# Patient Record
Sex: FEMALE | Race: WHITE | NOT HISPANIC OR LATINO | Employment: OTHER | URBAN - NONMETROPOLITAN AREA
[De-identification: names, ages, dates, MRNs, and addresses within clinical notes are randomized per-mention and may not be internally consistent; named-entity substitution may affect disease eponyms.]

---

## 2017-03-23 ENCOUNTER — OFFICE VISIT (OUTPATIENT)
Dept: ORTHOPEDIC SURGERY | Facility: CLINIC | Age: 62
End: 2017-03-23

## 2017-03-23 DIAGNOSIS — M18.0 PRIMARY OSTEOARTHRITIS OF BOTH FIRST CARPOMETACARPAL JOINTS: Primary | ICD-10-CM

## 2017-03-23 PROCEDURE — 99213 OFFICE O/P EST LOW 20 MIN: CPT | Performed by: ORTHOPAEDIC SURGERY

## 2017-03-23 PROCEDURE — 20600 DRAIN/INJ JOINT/BURSA W/O US: CPT | Performed by: ORTHOPAEDIC SURGERY

## 2017-03-23 RX ADMIN — LIDOCAINE HYDROCHLORIDE 2 ML: 10 INJECTION, SOLUTION INFILTRATION; PERINEURAL at 13:50

## 2017-03-23 RX ADMIN — METHYLPREDNISOLONE ACETATE 160 MG: 80 INJECTION, SUSPENSION INTRA-ARTICULAR; INTRALESIONAL; INTRAMUSCULAR; SOFT TISSUE at 13:50

## 2017-04-05 RX ORDER — LIDOCAINE HYDROCHLORIDE 10 MG/ML
2 INJECTION, SOLUTION INFILTRATION; PERINEURAL
Status: COMPLETED | OUTPATIENT
Start: 2017-03-23 | End: 2017-03-23

## 2017-04-05 RX ORDER — METHYLPREDNISOLONE ACETATE 80 MG/ML
160 INJECTION, SUSPENSION INTRA-ARTICULAR; INTRALESIONAL; INTRAMUSCULAR; SOFT TISSUE
Status: COMPLETED | OUTPATIENT
Start: 2017-03-23 | End: 2017-03-23

## 2017-08-14 ENCOUNTER — CLINICAL SUPPORT (OUTPATIENT)
Dept: ORTHOPEDIC SURGERY | Facility: CLINIC | Age: 62
End: 2017-08-14

## 2017-08-14 DIAGNOSIS — M18.0 PRIMARY OSTEOARTHRITIS OF BOTH FIRST CARPOMETACARPAL JOINTS: Primary | ICD-10-CM

## 2017-08-14 PROCEDURE — 20600 DRAIN/INJ JOINT/BURSA W/O US: CPT | Performed by: ORTHOPAEDIC SURGERY

## 2017-08-14 PROCEDURE — 99213 OFFICE O/P EST LOW 20 MIN: CPT | Performed by: ORTHOPAEDIC SURGERY

## 2017-08-14 RX ADMIN — METHYLPREDNISOLONE ACETATE 160 MG: 80 INJECTION, SUSPENSION INTRA-ARTICULAR; INTRALESIONAL; INTRAMUSCULAR; SOFT TISSUE at 10:35

## 2017-08-14 RX ADMIN — LIDOCAINE HYDROCHLORIDE 2 ML: 10 INJECTION, SOLUTION INFILTRATION; PERINEURAL at 10:35

## 2017-08-14 NOTE — PROGRESS NOTES
Pearl Temple  ?  1955  ?  Chief Complaint   Patient presents with   • Left Thumb - Follow-up     ?  HPI the patient is here today for a follow up of her left thumb pain and discomfort.She has a lot of pain and discomfort when she tries to make a fist.  The pain is at the base of the first metacarpal.  She has weakness of pinch and  strength because of the pain and discomfort.  She has developed progressive subluxation of the first metacarpal base on the CMC joint.  She has thought about surgical intervention because of progressive symptoms and impairment of her quality of life.     Physical Exam   Constitutional: She is oriented to person, place, and time. She appears well-nourished.   HENT:   Head: Atraumatic.   Eyes: EOM are normal.   Neck: Neck supple.   Cardiovascular: Intact distal pulses.    Pulmonary/Chest: Effort normal and breath sounds normal.   Abdominal: Bowel sounds are normal.   Musculoskeletal: She exhibits edema and tenderness.   Neurological: She is alert and oriented to person, place, and time. She has normal reflexes.   Skin: Skin is dry.   Psychiatric: She has a normal mood and affect. Her behavior is normal. Judgment and thought content normal.   Nursing note and vitals reviewed.      Review of Systems   Constitutional: Negative.    HENT: Negative.    Eyes: Negative.    Respiratory: Negative.    Cardiovascular: Negative.    Gastrointestinal: Negative.    Endocrine: Negative.    Genitourinary: Negative.    Musculoskeletal: Negative.    Skin: Negative.    Allergic/Immunologic: Negative.    Neurological: Negative.    Hematological: Negative.    Psychiatric/Behavioral: Negative.        Joint/Body Part Specific Exam:   left hand. Tenderness is present at base of the 1st metacarpal. Skin and soft tissues are mildly swollen. Flexor and extensor tendon function is well preserved. Capillary refill is 2 seconds with a brisk return. Axial lading of the joint causes crepitus and pain. Pinch  strength is compromised. Slight subluxation of the 1st metacarpal base is noted. Neurovascular status is intact.    left wrist. The patient is right  hand dominant. There is a significant amount of swelling and tenderness along the 1st dorsal compartment tendons. Abduction of the thumb bothers the patient significantly. There is pain and tenderness over the radial styloid process. Skin and soft tissues are somewhat swollen and mildly bruised. Finkelstein sign is positive. Ulnar deviation of the wrist bothers the patient significantly. Patients  strength is somewhat compromised because of the pain along the base of the thumb and over the radial styloid process. Patient is unable to make a fist with good  strength when fist is clenched. Capillary refill is 2 seconds with a brisk return. Radial artery pulses are palpable. Median nerve function is well preserved. There is some amount of inflammation over the dorsal extensor tendon sheaths over the remaining radial carpal compartments dorsally.  X-Ray Report:    Diagnostics:    Small Joint Arthrocentesis  Consent given by: patient  Site marked: site marked  Timeout: Immediately prior to procedure a time out was called to verify the correct patient, procedure, equipment, support staff and site/side marked as required   Supporting Documentation  Indications: pain   Procedure Details  Location: thumb - L thumb CMC  Preparation: Patient was prepped and draped in the usual sterile fashion  Needle size: 27 G  Medications administered: 2 mL lidocaine 1 %; 160 mg methylPREDNISolone acetate 80 MG/ML  Patient tolerance: patient tolerated the procedure well with no immediate complications        ?  ?  Plan      · Ice pack for 48 hrs     · Injected patients left thumb CMC joint with Steroid    · Ace wrap for swelling PRN    · Elevation for swelling PRN    · Tablet Ibuprofen 600 mg P.O. BID x 5 Days with meals    · Call office for any problems  With procedure    · Home  Physical Therapy Program discussed with patient    · F/U in 3 months for Re-evaluation.  ·   · Patient has a lot of pain and discomfort with using anti-inflammatory medication and therefore I have prescribed her a prescription of Pennsaid for topical analgesic application as an anti-inflammatory for local comfort.  ·   · Also discussed with the patient about using a supportive thumb spica style brace to protect the function of the thumb.

## 2017-08-24 RX ORDER — METHYLPREDNISOLONE ACETATE 80 MG/ML
160 INJECTION, SUSPENSION INTRA-ARTICULAR; INTRALESIONAL; INTRAMUSCULAR; SOFT TISSUE
Status: COMPLETED | OUTPATIENT
Start: 2017-08-14 | End: 2017-08-14

## 2017-08-24 RX ORDER — LIDOCAINE HYDROCHLORIDE 10 MG/ML
2 INJECTION, SOLUTION INFILTRATION; PERINEURAL
Status: COMPLETED | OUTPATIENT
Start: 2017-08-14 | End: 2017-08-14

## 2017-10-10 ENCOUNTER — TELEPHONE (OUTPATIENT)
Dept: ORTHOPEDIC SURGERY | Facility: CLINIC | Age: 62
End: 2017-10-10

## 2017-10-10 NOTE — TELEPHONE ENCOUNTER
PATIENT CALLED AND STATED THAT THE [employer] WAS REALLY HELPING HER, HOWEVER, IT IS NOT COVERED UNDER HER INSURANCE.  IS THERE ANYTHING ELSE DR. BLANCO CAN PRESCRIBE?

## 2017-11-21 ENCOUNTER — CLINICAL SUPPORT (OUTPATIENT)
Dept: ORTHOPEDIC SURGERY | Facility: CLINIC | Age: 62
End: 2017-11-21

## 2017-11-21 DIAGNOSIS — M18.0 PRIMARY OSTEOARTHRITIS OF BOTH FIRST CARPOMETACARPAL JOINTS: Primary | ICD-10-CM

## 2017-11-21 PROCEDURE — 20600 DRAIN/INJ JOINT/BURSA W/O US: CPT | Performed by: ORTHOPAEDIC SURGERY

## 2017-11-21 PROCEDURE — 99213 OFFICE O/P EST LOW 20 MIN: CPT | Performed by: ORTHOPAEDIC SURGERY

## 2017-11-21 RX ADMIN — METHYLPREDNISOLONE ACETATE 160 MG: 80 INJECTION, SUSPENSION INTRA-ARTICULAR; INTRALESIONAL; INTRAMUSCULAR; SOFT TISSUE at 13:43

## 2017-11-21 RX ADMIN — LIDOCAINE HYDROCHLORIDE 2 ML: 10 INJECTION, SOLUTION INFILTRATION; PERINEURAL at 13:43

## 2017-11-21 NOTE — PROGRESS NOTES
Pearl Temple  ?  1955  ?  Chief Complaint   Patient presents with   • Left Thumb - Follow-up     ?  HPI the patient is here today for left thumb pain And discomfort.  She has increasing pain and discomfort over the base of the thumb over the CMC joint.  Axial loading of the joint bothers the patient significantly.  She is unable to grasp heavy objects because her  strength is associated with pain and weakness.  She has a sense of crepitus throughout the range of motion especially when she is making a fist.  Surgical options at been discussed with the patient extensively.  She is not quite ready to undergo a CMC joint arthroplasty just yet.  Her right middle finger is bothering her significantly as well.  There is a tender nodule on the volar aspect of the base of the MCP joint of the middle finger.  Symptoms of early triggering have started and she has difficulty when she makes a full fist.  Full extension of the digit can sometimes not be accomplished spontaneously and the finger has to be pulled out into full extension with direct pressure.    Physical Exam   Constitutional: She is oriented to person, place, and time. She appears well-nourished.   HENT:   Head: Atraumatic.   Eyes: EOM are normal.   Neck: Neck supple.   Cardiovascular: Normal rate and intact distal pulses.    Pulmonary/Chest: Effort normal and breath sounds normal.   Abdominal: Bowel sounds are normal.   Musculoskeletal: She exhibits edema and tenderness.   Neurological: She is alert and oriented to person, place, and time. She has normal reflexes.   Skin: Skin is dry.   Psychiatric: She has a normal mood and affect. Her behavior is normal. Judgment and thought content normal.   Nursing note and vitals reviewed.      Review of Systems   Constitutional: Negative.    HENT: Negative.    Eyes: Negative.    Respiratory: Negative.    Cardiovascular: Negative.    Gastrointestinal: Negative.    Endocrine: Negative.    Genitourinary: Negative.     Musculoskeletal: Positive for joint swelling.   Skin: Negative.    Allergic/Immunologic: Negative.    Neurological: Negative.    Hematological: Negative.    Psychiatric/Behavioral: Negative.        Joint/Body Part Specific Exam:   left hand. Tenderness is present at base of the 1st metacarpal. Skin and soft tissues are mildly swollen. Flexor and extensor tendon function is well preserved. Capillary refill is 2 seconds with a brisk return. Axial lading of the joint causes crepitus and pain. Pinch strength is compromised. Slight subluxation of the 1st metacarpal base is noted. Neurovascular status is intact.    right wrist. Trigger right middle finger. Skin is swollen over volar aspect of MCP joint. Flexor and extensor tendon functions are both well maintained. Tender nodule over the volar aspect of MCP joint. Capillary refill is two seconds with a brisk return. Triggering is passively correctable. Median nerve function is normal. Radial artery pulse is palpable.  X-Ray Report:    Diagnostics:    Small Joint Arthrocentesis  Consent given by: patient  Site marked: site marked  Timeout: Immediately prior to procedure a time out was called to verify the correct patient, procedure, equipment, support staff and site/side marked as required   Supporting Documentation  Indications: pain   Procedure Details  Location: thumb - L thumb CMC  Preparation: Patient was prepped and draped in the usual sterile fashion  Needle size: 27 G  Medications administered: 2 mL lidocaine 1 %; 160 mg methylPREDNISolone acetate 80 MG/ML  Patient tolerance: patient tolerated the procedure well with no immediate complications        ?  ?  Plan      · Ice pack for 48 hrs     · Injected patients CMC joint of the left with Steroid    · Ace wrap for swelling PRN    · Elevation for swelling PRN    · Tablet Ibuprofen 600 mg P.O. BID x 5 Days with meals    · Call office for any problems  With procedure    · Home Physical Therapy Program discussed with  patient    · F/U in 3 months for Re-evaluation.  ·   · Discussed with the patient about the pathology of the locking trigger digit and eventually the fact that she is most likely going to need a surgical release of this A1 pulley to minimize her symptoms.  She will let me know when her symptoms progress and she wants to proceed with surgical resection of the A1 pulley and release of the flexor tendons of the finger.

## 2017-11-25 RX ORDER — LIDOCAINE HYDROCHLORIDE 10 MG/ML
2 INJECTION, SOLUTION INFILTRATION; PERINEURAL
Status: COMPLETED | OUTPATIENT
Start: 2017-11-21 | End: 2017-11-21

## 2017-11-25 RX ORDER — METHYLPREDNISOLONE ACETATE 80 MG/ML
160 INJECTION, SUSPENSION INTRA-ARTICULAR; INTRALESIONAL; INTRAMUSCULAR; SOFT TISSUE
Status: COMPLETED | OUTPATIENT
Start: 2017-11-21 | End: 2017-11-21

## 2018-02-20 ENCOUNTER — OFFICE VISIT (OUTPATIENT)
Dept: ORTHOPEDIC SURGERY | Facility: CLINIC | Age: 63
End: 2018-02-20

## 2018-02-20 DIAGNOSIS — M18.12 ARTHRITIS OF CARPOMETACARPAL (CMC) JOINT OF LEFT THUMB: ICD-10-CM

## 2018-02-20 DIAGNOSIS — G89.29 CHRONIC LEFT SHOULDER PAIN: Primary | ICD-10-CM

## 2018-02-20 DIAGNOSIS — M25.512 CHRONIC LEFT SHOULDER PAIN: Primary | ICD-10-CM

## 2018-02-20 PROCEDURE — 99213 OFFICE O/P EST LOW 20 MIN: CPT | Performed by: ORTHOPAEDIC SURGERY

## 2018-02-20 PROCEDURE — 73030 X-RAY EXAM OF SHOULDER: CPT | Performed by: ORTHOPAEDIC SURGERY

## 2018-02-20 PROCEDURE — 20600 DRAIN/INJ JOINT/BURSA W/O US: CPT | Performed by: ORTHOPAEDIC SURGERY

## 2018-02-20 RX ORDER — DAPAGLIFLOZIN 5 MG/1
TABLET, FILM COATED ORAL
COMMUNITY
Start: 2018-02-03

## 2018-02-20 RX ORDER — FLUCONAZOLE 150 MG/1
TABLET ORAL
COMMUNITY
Start: 2017-12-18

## 2018-02-20 RX ORDER — DOXYCYCLINE 100 MG/1
CAPSULE ORAL
COMMUNITY
Start: 2017-12-18 | End: 2020-08-13

## 2018-02-20 RX ORDER — ESOMEPRAZOLE MAGNESIUM 40 MG/1
CAPSULE, DELAYED RELEASE ORAL
COMMUNITY
Start: 2018-02-10

## 2018-02-20 RX ORDER — EMPAGLIFLOZIN 10 MG/1
TABLET, FILM COATED ORAL
COMMUNITY
Start: 2017-11-13 | End: 2019-09-05

## 2018-02-20 RX ADMIN — METHYLPREDNISOLONE ACETATE 160 MG: 80 INJECTION, SUSPENSION INTRA-ARTICULAR; INTRALESIONAL; INTRAMUSCULAR; SOFT TISSUE at 15:20

## 2018-02-20 RX ADMIN — LIDOCAINE HYDROCHLORIDE 2 ML: 10 INJECTION, SOLUTION EPIDURAL; INFILTRATION; INTRACAUDAL; PERINEURAL at 15:20

## 2018-03-08 PROBLEM — M18.12 ARTHRITIS OF CARPOMETACARPAL (CMC) JOINT OF LEFT THUMB: Status: ACTIVE | Noted: 2018-03-08

## 2018-03-08 RX ORDER — METHYLPREDNISOLONE ACETATE 80 MG/ML
160 INJECTION, SUSPENSION INTRA-ARTICULAR; INTRALESIONAL; INTRAMUSCULAR; SOFT TISSUE
Status: COMPLETED | OUTPATIENT
Start: 2018-02-20 | End: 2018-02-20

## 2018-03-08 RX ORDER — LIDOCAINE HYDROCHLORIDE 10 MG/ML
2 INJECTION, SOLUTION EPIDURAL; INFILTRATION; INTRACAUDAL; PERINEURAL
Status: COMPLETED | OUTPATIENT
Start: 2018-02-20 | End: 2018-02-20

## 2018-03-16 ENCOUNTER — TELEPHONE (OUTPATIENT)
Dept: ORTHOPEDIC SURGERY | Facility: CLINIC | Age: 63
End: 2018-03-16

## 2018-03-16 NOTE — TELEPHONE ENCOUNTER
I called and advised patient her MRI was denied and that she will need to have at least 6 weeks of physical therapy before we can try to reorder it. Patient understands that I will fax [facility] and they will call to schedule her. Ms. Temple will call back after she's completed 6 weeks of therapy.

## 2018-06-11 ENCOUNTER — TELEPHONE (OUTPATIENT)
Dept: ORTHOPEDIC SURGERY | Facility: CLINIC | Age: 63
End: 2018-06-11

## 2018-06-11 NOTE — TELEPHONE ENCOUNTER
Patient called stating her therapy has been completed and she would like to try to have the MRI scheduled for her left shoulder.

## 2018-07-05 ENCOUNTER — OFFICE VISIT (OUTPATIENT)
Dept: ORTHOPEDIC SURGERY | Facility: CLINIC | Age: 63
End: 2018-07-05

## 2018-07-05 DIAGNOSIS — M25.512 CHRONIC LEFT SHOULDER PAIN: Primary | ICD-10-CM

## 2018-07-05 DIAGNOSIS — G89.29 CHRONIC LEFT SHOULDER PAIN: Primary | ICD-10-CM

## 2018-07-05 PROCEDURE — 99214 OFFICE O/P EST MOD 30 MIN: CPT | Performed by: ORTHOPAEDIC SURGERY

## 2018-07-05 NOTE — PROGRESS NOTES
Chief Complaint   Patient presents with   • Left Shoulder - Results           HPI the patient is here today for MRI results of her left shoulder. She continues to have significant amounts of pain and discomfort over the left shoulder.  She has distinct weakness in abduction.  She states that she is absolutely miserable at night because of the pain and discomfort.  She cannot reach up into the overhead abducted position.  Any form of external rotation bothers the patient significantly as well.  The patient states that she would like to proceed with some form of intervention ASAP because her quality of life is very negative because of the limited range of motion and the severe pain she is experiencing from her adhesive capsulitis.        There were no vitals filed for this visit.        Review of Systems   Constitutional: Negative.    HENT: Negative.    Eyes: Negative.    Respiratory: Negative.    Cardiovascular: Negative.    Gastrointestinal: Negative.    Endocrine: Negative.    Genitourinary: Negative.    Musculoskeletal: Positive for joint swelling.   Skin: Negative.    Allergic/Immunologic: Negative.    Neurological: Negative.    Hematological: Negative.    Psychiatric/Behavioral: Negative.            Physical Exam   Constitutional: She is oriented to person, place, and time. She appears well-nourished.   HENT:   Head: Atraumatic.   Eyes: EOM are normal.   Neck: Neck supple.   Cardiovascular: Normal rate, regular rhythm, normal heart sounds and intact distal pulses.    Pulmonary/Chest: Breath sounds normal.   Abdominal: Soft. Bowel sounds are normal.   Musculoskeletal: She exhibits edema and tenderness.   Neurological: She is alert and oriented to person, place, and time.   Skin: Skin is dry. Capillary refill takes 2 to 3 seconds.   Psychiatric: Her behavior is normal. Thought content normal.   Nursing note and vitals reviewed.          Joint/Body Part Specific Exam:  left Shoulder. The shoulder range of motion is  extremely limited both in forward flexion, abduction and external rotation. Rotator cuff function cannot be determined because of lack of motion. The scapula is moving with the humerus upon attempted abduction. The range of motion is abduction 0-30 degrees, forward fexion 0-60° degrees, external rotation 0-10° degrees.  Skin and soft tissues are somewhat swollen and tender. There is anterior joint line tenderness. There is some winging of the scapula. Axillary nerve function is well preserved. Radial artery pulses are palpable. The motion is extremely limited when compared to the contralateral extremity. The pain level is 6 .      X-RAY Report:        Diagnostics:  MRI reports are available.  The images are discussed with the patient.  There is thickening of the anterior capsule of the shoulder joint.  There is no evidence of tear of the rotator cuff.  There are some partial thickness changes in the tendon but no full-thickness tear is demonstrated.  No SLAP tear is noted on the films.      Pearl was seen today for results.    Diagnoses and all orders for this visit:    Chronic left shoulder pain            Procedures        Plan:  The patient has developed adhesive capsulitis and a frozen shoulder.    Tablet ibuprofen 600 mg orally twice a day for pain swelling and discomfort.    Home-based aggressive exercise program including stretching and strengthening with an overhead pulley.    The patient wants to proceed with manipulation of the shoulder and anesthesia ASAP.  We will go ahead and schedule this surgical intervention for the patient this coming Monday    Risks and benefits of surgical intervention as well as the rationale for surgery discussed with the patient.  She does understand that this would be a staged manipulation and if she does not accomplished full range of motion and relief of pain with manipulation and aggressive physical therapy to follow, then we will proceed with arthroscopic lysis of adhesions  and capsular release in a surgical fashion.    All risks and benefits discussed with the patient to the extent of 30 minutes.  Greater than 50% of my time was spent making the surgical decision with the patient going over the MRI reports and taking a plan of action going forward.    Possibility of continued pain, limited range of motion, iatrogenic proximal humerus fracture, reflex symptomatic dystrophy and other general surgical complications discussed with the patient at length.  She  understands and accepts that situation and wants to proceed with intervention.

## 2018-07-09 ENCOUNTER — OUTSIDE FACILITY SERVICE (OUTPATIENT)
Dept: ORTHOPEDIC SURGERY | Facility: CLINIC | Age: 63
End: 2018-07-09

## 2018-07-09 PROCEDURE — 23700 MNPJ ANES SHO JT FIXJ APRATS: CPT | Performed by: ORTHOPAEDIC SURGERY

## 2018-07-10 ENCOUNTER — TELEPHONE (OUTPATIENT)
Dept: ORTHOPEDIC SURGERY | Facility: CLINIC | Age: 63
End: 2018-07-10

## 2018-07-10 DIAGNOSIS — Z98.890 STATUS POST MANIPULATION OF ACROMIOCLAVICULAR JOINT: Primary | ICD-10-CM

## 2018-07-10 NOTE — TELEPHONE ENCOUNTER
ALEIDA, PHYSICAL THERAPIST AT [facility], CALLED REQUESTING PT ORDERS FOR PATIENT.  PATIENT IS S/P LEFT SHOULDER MANIPULATION ON 7/09/18.  PLEASE FAX ORDER -[phone]

## 2018-07-13 ENCOUNTER — OFFICE VISIT (OUTPATIENT)
Dept: ORTHOPEDIC SURGERY | Facility: CLINIC | Age: 63
End: 2018-07-13

## 2018-07-13 DIAGNOSIS — Z98.890 S/P MANIPULATION OF AC JOINT: Primary | ICD-10-CM

## 2018-07-13 PROCEDURE — 99024 POSTOP FOLLOW-UP VISIT: CPT | Performed by: ORTHOPAEDIC SURGERY

## 2018-07-13 NOTE — PROGRESS NOTES
Chief Complaint   Patient presents with   • Left Shoulder - Follow-up         HPI  Patient returns s/p left shoulder manipulation.  She states she does have improvement.  Her range of motion is better with passive exercises.  Actively, she is still limited. She is doing a whole lot better after the manipulation.  The sharp stabbing pain has settled down nicely.  She does not have any nighttime pain or discomfort.  She states that her improvement is slow at this time but she does understand that she is going to have to work hard with her physical therapy protocols to improve his range of motion.  She so far, she is very pleased with the outcome of the manipulation of the frozen shoulder.        There were no vitals filed for this visit.        Joint/Body Part Specific Exam:  left Shoulder. Patient has signs of impingement with internal and external rotation. There is a lot of pain and tenderness for the patient over the subcromial bursa. Talavera’s sign is positive. Neer sign is positive. Forward flexion is 0-100 degrees, abduction is 0-110 degrees, external rotation is 0-30 degrees. Rotator cuff function is fairly well preserved except for the impingement at 90 degrees. Apprehension sign is negative. Axillary nerve function is well preserved. Radial artery pulses are palpable. There is no evidence of multidirectional instability. Sulcus sign is negative. Drop arm sign is negative. The patient has some ill-defined tenderness over the greater tuberosity of the humerus. The pain level is 4.      X-RAY REPORT:        Pearl was seen today for follow-up.    Diagnoses and all orders for this visit:    S/P manipulation of AC joint            Procedures        Instructions:      Plan:Continue with aggressive range of motion of the shoulders including the use of an overhead pulley to prevent arthrofibrosis.    Patient will continue physical therapy in an outpatient basis at [facility].    Tablet ibuprofen 600 mg  orally twice a day for pain swelling and discomfort.    Ice to the shoulder for comfort.    Follow-up in my office in 4 weeks for reevaluation.

## 2018-07-17 PROBLEM — Z98.890 S/P MANIPULATION OF AC JOINT: Status: ACTIVE | Noted: 2018-07-17

## 2018-07-26 ENCOUNTER — TELEPHONE (OUTPATIENT)
Dept: ORTHOPEDIC SURGERY | Facility: CLINIC | Age: 63
End: 2018-07-26

## 2018-08-23 ENCOUNTER — OFFICE VISIT (OUTPATIENT)
Dept: ORTHOPEDIC SURGERY | Facility: CLINIC | Age: 63
End: 2018-08-23

## 2018-08-23 DIAGNOSIS — Z98.890 S/P MANIPULATION OF AC JOINT: Primary | ICD-10-CM

## 2018-08-23 DIAGNOSIS — G89.29 CHRONIC LEFT SHOULDER PAIN: ICD-10-CM

## 2018-08-23 DIAGNOSIS — M25.512 CHRONIC LEFT SHOULDER PAIN: ICD-10-CM

## 2018-08-23 PROCEDURE — 20610 DRAIN/INJ JOINT/BURSA W/O US: CPT | Performed by: ORTHOPAEDIC SURGERY

## 2018-08-23 PROCEDURE — 99213 OFFICE O/P EST LOW 20 MIN: CPT | Performed by: ORTHOPAEDIC SURGERY

## 2018-08-23 RX ADMIN — METHYLPREDNISOLONE ACETATE 160 MG: 80 INJECTION, SUSPENSION INTRA-ARTICULAR; INTRALESIONAL; INTRAMUSCULAR; SOFT TISSUE at 10:19

## 2018-08-23 RX ADMIN — LIDOCAINE HYDROCHLORIDE 2 ML: 10 INJECTION, SOLUTION INFILTRATION; PERINEURAL at 10:19

## 2018-09-04 RX ORDER — LIDOCAINE HYDROCHLORIDE 10 MG/ML
2 INJECTION, SOLUTION INFILTRATION; PERINEURAL
Status: COMPLETED | OUTPATIENT
Start: 2018-08-23 | End: 2018-08-23

## 2018-09-04 RX ORDER — METHYLPREDNISOLONE ACETATE 80 MG/ML
160 INJECTION, SUSPENSION INTRA-ARTICULAR; INTRALESIONAL; INTRAMUSCULAR; SOFT TISSUE
Status: COMPLETED | OUTPATIENT
Start: 2018-08-23 | End: 2018-08-23

## 2018-10-09 ENCOUNTER — OFFICE VISIT (OUTPATIENT)
Dept: ORTHOPEDIC SURGERY | Facility: CLINIC | Age: 63
End: 2018-10-09

## 2018-10-09 DIAGNOSIS — Z98.890 S/P MANIPULATION OF AC JOINT: Primary | ICD-10-CM

## 2018-10-09 PROCEDURE — 99213 OFFICE O/P EST LOW 20 MIN: CPT | Performed by: ORTHOPAEDIC SURGERY

## 2018-10-09 NOTE — PROGRESS NOTES
Chief Complaint   Patient presents with   • Left Shoulder - Follow-up           HPI  Patient returns for a follow up of her left shoulder.  She is doing a lot better after the manipulation of the left shoulder and anesthesia.  She has been attending physical therapy regularly.  The therapy is helping improve the range of motion and decrease the pain.  She still has some nighttime pain and discomfort.  There is still a sense of stiffness in the shoulder.  She states that she has to work the shoulder aggressively every single day to overcome the stiffness.  She is pleased with her outcome with the physical therapy.  She is now able to abduct the arm above 90°.  The patient is still lacking some strength and she is working on that with her therapist.  She states that the use of a TENS unit is very significantly beneficial for her.        There were no vitals filed for this visit.        Review of Systems   Constitutional: Negative.    HENT: Negative.    Eyes: Negative.    Respiratory: Negative.    Cardiovascular: Negative.    Endocrine: Negative.    Genitourinary: Negative.    Musculoskeletal: Positive for joint swelling.   Skin: Negative.    Allergic/Immunologic: Negative.    Neurological: Negative.    Hematological: Negative.    Psychiatric/Behavioral: Negative.            Physical Exam   Constitutional: She appears well-nourished.   HENT:   Head: Atraumatic.   Eyes: EOM are normal.   Neck: Neck supple.   Cardiovascular: Normal rate.    Pulmonary/Chest: Breath sounds normal.   Abdominal: Bowel sounds are normal.   Musculoskeletal: She exhibits edema and tenderness.   Neurological: She is alert.   Skin: Capillary refill takes 2 to 3 seconds.   Psychiatric: She has a normal mood and affect. Her behavior is normal. Thought content normal.   Nursing note and vitals reviewed.          Joint/Body Part Specific Exam:  left Shoulder. Patient has signs of impingement with internal and external rotation. There is a lot of pain  and tenderness for the patient over the subcromial bursa. Talavera’s sign is positive. Neer sign is positive. Forward flexion is 0-120 degrees, abduction is 0-120 degrees, external rotation is 0-30 degrees. Rotator cuff function is fairly well preserved except for the impingement at 90 degrees. Apprehension sign is negative. Axillary nerve function is well preserved. Radial artery pulses are palpable. There is no evidence of multidirectional instability. Sulcus sign is negative. Drop arm sign is negative. The patient has some ill-defined tenderness over the greater tuberosity of the humerus. The pain level is 4.      X-RAY Report:        Diagnostics:        Pearl was seen today for follow-up.    Diagnoses and all orders for this visit:    S/P manipulation of AC joint            Procedures        Plan: Continue with aggressive range of motion with the overhead abduction including the use of a pulley to prevent recurrence of the left frozen shoulder.    Continue to use the TENS unit to help manage the chronic pain.    Tablet ibuprofen 600 mg orally twice a day for pain swelling and discomfort.    Intra-articular steroid injection discussed and offered to the patient but she declines stating that her pain is tolerable at this point.    Follow-up in my office in 3 months.    Continue with physical therapy on an outpatient basis in [facility].

## 2018-12-13 ENCOUNTER — CLINICAL SUPPORT (OUTPATIENT)
Dept: ORTHOPEDIC SURGERY | Facility: CLINIC | Age: 63
End: 2018-12-13

## 2018-12-13 DIAGNOSIS — Z98.890 S/P MANIPULATION OF AC JOINT: Primary | ICD-10-CM

## 2018-12-13 DIAGNOSIS — M77.8 DELTOID TENDINITIS OF LEFT SHOULDER: ICD-10-CM

## 2018-12-13 DIAGNOSIS — G89.29 CHRONIC LEFT SHOULDER PAIN: ICD-10-CM

## 2018-12-13 DIAGNOSIS — M25.512 CHRONIC LEFT SHOULDER PAIN: ICD-10-CM

## 2018-12-13 PROCEDURE — 99213 OFFICE O/P EST LOW 20 MIN: CPT | Performed by: ORTHOPAEDIC SURGERY

## 2018-12-13 NOTE — PROGRESS NOTES
Chief Complaint   Patient presents with   • Left Shoulder - Follow-up   Patient is here today following up on her left shoulder.      HPI  She returns for follow up on left shoulder. She continues to improve following manipulation of the shoulder. She continues to use a pulley and now has full ROM. She had great benefit from working with PT and continues to use those exercises.       There were no vitals filed for this visit.        Review of Systems        Physical Exam        Joint/Body Part Specific Exam:  Left Shoulder. Patient has signs of impingement with internal and external rotation. There is a lot of pain and tenderness for the patient over the subcromial bursa. Talavera’s sign is positive. Neer sign is positive. Forward flexion is 0-180 degrees, abduction is 0-150 degrees, external rotation is 0-90 degrees. Rotator cuff function is fairly well preserved except for the impingement at 90 degrees. Apprehension sign is negative. Axillary nerve function is well preserved. Radial artery pulses are palpable. There is no evidence of multidirectional instability. Sulcus sign is negative. Drop arm sign is negative. The patient has some ill-defined tenderness over the greater tuberosity of the humerus and into the deltoid tendon insertion site. The pain level is 4.      X-RAY Report:        Diagnostics:        Pearl was seen today for follow-up.    Diagnoses and all orders for this visit:    S/P manipulation of AC joint    Chronic left shoulder pain    Deltoid tendinitis of left shoulder    Other orders  -     Large Joint Arthrocentesis            Large Joint Arthrocentesis  Date/Time: 12/14/2018 12:59 PM  Consent given by: patient  Site marked: site marked  Timeout: Immediately prior to procedure a time out was called to verify the correct patient, procedure, equipment, support staff and site/side marked as required   Supporting Documentation  Indications: pain, joint swelling and diagnostic evaluation   Procedure  Details  Location: shoulder - Shoulder joint: insertion site of left deltoid tendon.  Preparation: Patient was prepped and draped in the usual sterile fashion  Needle size: 25 G  Medications administered: 160 mg methylPREDNISolone acetate 80 MG/ML; 2 mL lidocaine PF 2% 2 %                Plan:    Continue with aggressive range of motion with the overhead abduction including the use of a pulley to prevent recurrence of the left frozen shoulder.    Steroid injection given at left deltoid tendon insertion site     Continue to use the TENS unit to help manage the chronic pain.     Tablet ibuprofen 600 mg orally twice a day for pain swelling and discomfort.     Follow-up in my office in 3 months.     Continue with physical therapy on an outpatient basis in [facility].

## 2018-12-14 PROBLEM — M77.8 DELTOID TENDINITIS OF LEFT SHOULDER: Status: ACTIVE | Noted: 2018-12-14

## 2018-12-14 PROCEDURE — 20610 DRAIN/INJ JOINT/BURSA W/O US: CPT | Performed by: ORTHOPAEDIC SURGERY

## 2018-12-14 RX ORDER — METHYLPREDNISOLONE ACETATE 80 MG/ML
160 INJECTION, SUSPENSION INTRA-ARTICULAR; INTRALESIONAL; INTRAMUSCULAR; SOFT TISSUE
Status: COMPLETED | OUTPATIENT
Start: 2018-12-14 | End: 2018-12-14

## 2018-12-14 RX ORDER — LIDOCAINE HYDROCHLORIDE 20 MG/ML
2 INJECTION, SOLUTION EPIDURAL; INFILTRATION; INTRACAUDAL; PERINEURAL
Status: COMPLETED | OUTPATIENT
Start: 2018-12-14 | End: 2018-12-14

## 2018-12-14 RX ADMIN — METHYLPREDNISOLONE ACETATE 160 MG: 80 INJECTION, SUSPENSION INTRA-ARTICULAR; INTRALESIONAL; INTRAMUSCULAR; SOFT TISSUE at 12:59

## 2018-12-14 RX ADMIN — LIDOCAINE HYDROCHLORIDE 2 ML: 20 INJECTION, SOLUTION EPIDURAL; INFILTRATION; INTRACAUDAL; PERINEURAL at 12:59

## 2019-03-12 ENCOUNTER — CLINICAL SUPPORT (OUTPATIENT)
Dept: ORTHOPEDIC SURGERY | Facility: CLINIC | Age: 64
End: 2019-03-12

## 2019-03-12 VITALS — BODY MASS INDEX: 27.11 KG/M2 | HEIGHT: 63 IN | WEIGHT: 153 LBS | TEMPERATURE: 97.5 F

## 2019-03-12 DIAGNOSIS — M70.62 GREATER TROCHANTERIC BURSITIS OF BOTH HIPS: Primary | ICD-10-CM

## 2019-03-12 DIAGNOSIS — M70.61 GREATER TROCHANTERIC BURSITIS OF BOTH HIPS: Primary | ICD-10-CM

## 2019-03-12 DIAGNOSIS — M25.512 CHRONIC LEFT SHOULDER PAIN: ICD-10-CM

## 2019-03-12 DIAGNOSIS — G89.29 CHRONIC LEFT SHOULDER PAIN: ICD-10-CM

## 2019-03-12 PROCEDURE — 20610 DRAIN/INJ JOINT/BURSA W/O US: CPT | Performed by: ORTHOPAEDIC SURGERY

## 2019-03-12 RX ORDER — METHYLPREDNISOLONE ACETATE 80 MG/ML
160 INJECTION, SUSPENSION INTRA-ARTICULAR; INTRALESIONAL; INTRAMUSCULAR; SOFT TISSUE
Status: COMPLETED | OUTPATIENT
Start: 2019-03-12 | End: 2019-03-12

## 2019-03-12 RX ORDER — LIDOCAINE HYDROCHLORIDE 10 MG/ML
2 INJECTION, SOLUTION INFILTRATION; PERINEURAL
Status: COMPLETED | OUTPATIENT
Start: 2019-03-12 | End: 2019-03-12

## 2019-03-12 RX ADMIN — LIDOCAINE HYDROCHLORIDE 2 ML: 10 INJECTION, SOLUTION INFILTRATION; PERINEURAL at 14:06

## 2019-03-12 RX ADMIN — METHYLPREDNISOLONE ACETATE 160 MG: 80 INJECTION, SUSPENSION INTRA-ARTICULAR; INTRALESIONAL; INTRAMUSCULAR; SOFT TISSUE at 14:06

## 2019-03-12 NOTE — PROGRESS NOTES
INJECTION    Patient: Pearl Tmeple  YOB: 1955    Chief Complaints:Left hip pain and discomfort over the greater trochanter.Worse with abduction and rotation of leg.    History of Present Illness: Patient is seen in the office today for hip pain. The pain is over the lateral aspect of the left hip at the greater trochanteric bursa.  It has been progressive in nature but remains intermittent .  Worsened by prolonged standing or walking, cross body activities and laying on the affected side. Has had improvement in the past with heat, rest, injections NSAIDS.    This problem is not new to this examiner.     Allergies:   Allergies   Allergen Reactions   • Codeine    • Dye Fdc Red [Red Dye]    • Levaquin [Levofloxacin]    • Lortab [Hydrocodone-Acetaminophen]    • Morphine And Related    • Penicillins    • Robaxin [Methocarbamol]    • Toradol [Ketorolac Tromethamine]    • Valium [Diazepam]        Medications:   Home Medications:  Current Outpatient Medications on File Prior to Visit   Medication Sig   • ALREX 0.2 % suspension    • COMBIVENT RESPIMAT  MCG/ACT inhaler    • diclofenac (VOLTAREN) 1 % gel gel Apply 4 g topically 2 (Two) Times a Day.   • doxycycline (MONODOX) 100 MG capsule    • esomeprazole (nexIUM) 40 MG capsule    • FARXIGA 5 MG tablet tablet    • fluconazole (DIFLUCAN) 150 MG tablet    • JANUVIA 100 MG tablet    • JARDIANCE 10 MG tablet    • metFORMIN (GLUCOPHAGE) 1000 MG tablet    • montelukast (SINGULAIR) 10 MG tablet    • ranitidine (ZANTAC) 300 MG tablet      No current facility-administered medications on file prior to visit.      Current Medications:  Scheduled Meds:  PRN Meds:.    I have reviewed the patient's medical history in detail and updated the computerized patient record.  Review and summarization of old records include:    Past Medical History:   Diagnosis Date   • Acid reflux    • Diabetes (CMS/HCC)      Past Surgical History:   Procedure Laterality Date   •  "APPENDECTOMY     • CHOLECYSTECTOMY     • FOOT SURGERY     • HYSTERECTOMY     • KNEE SURGERY       Social History     Occupational History   • Not on file   Tobacco Use   • Smoking status: Never Smoker   Substance and Sexual Activity   • Alcohol use: No   • Drug use: Not on file   • Sexual activity: Not on file      Social History     Social History Narrative   • Not on file     Family History   Problem Relation Age of Onset   • Alzheimer's disease Mother    • Cancer Father        Review of Systems        Allergies:   Allergies   Allergen Reactions   • Codeine    • Dye Fdc Red [Red Dye]    • Levaquin [Levofloxacin]    • Lortab [Hydrocodone-Acetaminophen]    • Morphine And Related    • Penicillins    • Robaxin [Methocarbamol]    • Toradol [Ketorolac Tromethamine]    • Valium [Diazepam]        Wt Readings from Last 3 Encounters:   03/12/19 69.4 kg (153 lb)     Ht Readings from Last 3 Encounters:   03/12/19 160 cm (63\")     Body mass index is 27.1 kg/m².  Facility age limit for growth percentiles is 20 years.  Vitals:    03/12/19 1405   Temp: 97.5 °F (36.4 °C)       Physical Exam   Constitutional: Oriented to person, place, and time. Appears well-developed and well-nourished.   HENT:   Head: Normocephalic and atraumatic.   Eyes: Conjunctivae and EOM are normal. Pupils are equal, round, and reactive to light.   Cardiovascular: Normal rate, regular rhythm, normal heart sounds and intact distal pulses.   Pulmonary/Chest: Effort normal and breath sounds normal.   Musculoskeletal:   See detailed exam below   Neurological: Alert and oriented to person, place, and time. No sensory deficit. Coordination normal.   Skin: Skin is warm and dry. Capillary refill takes less than 2 seconds. No rash noted. No erythema.   Psychiatric: Normal mood and affect. Her behavior is normal. Judgment and thought content normal.   Nursing note and vitals reviewed.    Musculoskeletal:    Left hip (gtb). Skin and soft tissues over the greater " trochanteric bursa are painful and tender for the patient. Neurovascular status is intact. IT band is painful and tender. Cross body adduction bothers the patient significantly. Internal and external rotations bother the patient significantly with tenderness over the greater trochanter. There is no clinical deformity. No shortening. The patient does have a significant limp because by the trochanteric pain caused by the abductors. The piriformis is tight and with any rotation there is significant capsular tightness. Dorsalis pedis and posterior tibial artery pulses are palpable. Capillary refill is 2 seconds with a brisk return. Common peroneal nerve function is well preserved.      Diagnostics:      Procedure:  Large Joint Arthrocentesis: L greater trochanteric bursa  Date/Time: 3/12/2019 2:06 PM  Consent given by: patient  Site marked: site marked  Timeout: Immediately prior to procedure a time out was called to verify the correct patient, procedure, equipment, support staff and site/side marked as required   Supporting Documentation  Indications: pain   Procedure Details  Location: hip - L greater trochanteric bursa  Preparation: Patient was prepped and draped in the usual sterile fashion  Needle size: 25 G  Approach: anteromedial  Medications administered: 2 mL lidocaine 1 %; 160 mg methylPREDNISolone acetate 80 MG/ML  Patient tolerance: patient tolerated the procedure well with no immediate complications          Assessment:     ICD-10-CM ICD-9-CM   1. Greater trochanteric bursitis of both hips M70.61 726.5    M70.62    2. Chronic left shoulder pain M25.512 719.41    G89.29 338.29         Plan:   · Injected patient's left hip-greater trochanteric bursa joint(s)with steroid  from an anterolateral approach   · Compression/elastic brace if applicable  · Rest, ice, compression, and elevation (RICE) therapy  · OTC Ibuprofen 600mg by mouth every 6-8 hours as needed for pain and swelling  · Follow up in 3  month(s)      Honorio Hayes MD

## 2019-03-14 NOTE — TELEPHONE ENCOUNTER
"PATIENT CALLED AND IS WANTING TO KNOW IF IT'S NORMAL THAT HER ARM IS STILL NUMB.  SHE STATED THAT IT FEELS LIKE IT'S \"BURNT\".  PATIENT IS S/P LEFT SHOULDER MANIPULATION ON 7/09/18.  PLEASE ADVISE  "
CALLED PATIENT TO LET HER KNOW THAT DR. BLANCO FEELS SHE IS STILL EXPERIENCING RESIDUAL SEQUELA FROM THE BLOCK.  PATIENT WAS CONCERNED SINCE IT'S BEEN 3 WEEKS SINCE HER SURGERY.  PATIENT WAS ADVISED TO CALL OUR OFFICE BACK IF SHE IS STILL EXPERIENCING NUMBNESS 1 WEEK FROM NOW OR IF HER SYMPTOMS INCREASE  
She is probably still experiencing the residual sequela from the block for the manipulation of the shoulder.  I do expect it to wear off as time goes by.  Sometimes and numbness can last 1-2 weeks depending on the intensity of the block put in by the anesthesiologist.  Please call her and let her know this.  
Normal for race

## 2019-04-22 ENCOUNTER — OFFICE VISIT CONVERTED (OUTPATIENT)
Dept: PULMONOLOGY | Facility: CLINIC | Age: 64
End: 2019-04-22
Attending: INTERNAL MEDICINE

## 2019-05-23 ENCOUNTER — HOSPITAL ENCOUNTER (OUTPATIENT)
Dept: CARDIOLOGY | Facility: HOSPITAL | Age: 64
Discharge: HOME | End: 2019-05-23
Attending: INTERNAL MEDICINE

## 2019-05-28 LAB
CONV QUANTIFERON TB GOLD: NEGATIVE
QUANTIFERON CRITERIA: NORMAL
QUANTIFERON MITOGEN VALUE: >10 IU/ML
QUANTIFERON NIL VALUE: 0.09 IU/ML
QUANTIFERON TB1 AG VALUE: 0.03 IU/ML
QUANTIFERON TB2 AG VALUE: 0.02 IU/ML

## 2019-06-14 ENCOUNTER — OFFICE VISIT CONVERTED (OUTPATIENT)
Dept: PULMONOLOGY | Facility: CLINIC | Age: 64
End: 2019-06-14
Attending: INTERNAL MEDICINE

## 2019-09-05 ENCOUNTER — CLINICAL SUPPORT (OUTPATIENT)
Dept: ORTHOPEDIC SURGERY | Facility: CLINIC | Age: 64
End: 2019-09-05

## 2019-09-05 DIAGNOSIS — M18.12 ARTHRITIS OF CARPOMETACARPAL (CMC) JOINT OF LEFT THUMB: Primary | ICD-10-CM

## 2019-09-05 DIAGNOSIS — M70.62 GREATER TROCHANTERIC BURSITIS OF BOTH HIPS: ICD-10-CM

## 2019-09-05 DIAGNOSIS — M70.61 GREATER TROCHANTERIC BURSITIS OF BOTH HIPS: ICD-10-CM

## 2019-09-05 PROCEDURE — 20610 DRAIN/INJ JOINT/BURSA W/O US: CPT | Performed by: ORTHOPAEDIC SURGERY

## 2019-09-05 PROCEDURE — 99213 OFFICE O/P EST LOW 20 MIN: CPT | Performed by: ORTHOPAEDIC SURGERY

## 2019-09-05 PROCEDURE — 20600 DRAIN/INJ JOINT/BURSA W/O US: CPT | Performed by: ORTHOPAEDIC SURGERY

## 2019-09-05 RX ORDER — DOXYCYCLINE HYCLATE 50 MG/1
CAPSULE, GELATIN COATED ORAL
COMMUNITY
Start: 2019-07-25

## 2019-09-05 RX ORDER — DULAGLUTIDE 0.75 MG/.5ML
INJECTION, SOLUTION SUBCUTANEOUS
COMMUNITY
Start: 2019-08-20 | End: 2020-08-13

## 2019-09-05 RX ORDER — EMPAGLIFLOZIN 25 MG/1
TABLET, FILM COATED ORAL
COMMUNITY
Start: 2019-08-21

## 2019-09-05 RX ORDER — RANITIDINE 300 MG/1
CAPSULE ORAL
COMMUNITY
Start: 2019-08-21 | End: 2020-08-13

## 2019-09-05 RX ADMIN — LIDOCAINE HYDROCHLORIDE 2 ML: 10 INJECTION, SOLUTION EPIDURAL; INFILTRATION; INTRACAUDAL; PERINEURAL at 08:40

## 2019-09-05 RX ADMIN — LIDOCAINE HYDROCHLORIDE 2 ML: 10 INJECTION, SOLUTION EPIDURAL; INFILTRATION; INTRACAUDAL; PERINEURAL at 08:35

## 2019-09-05 RX ADMIN — METHYLPREDNISOLONE ACETATE 160 MG: 80 INJECTION, SUSPENSION INTRA-ARTICULAR; INTRALESIONAL; INTRAMUSCULAR; SOFT TISSUE at 08:40

## 2019-09-05 RX ADMIN — METHYLPREDNISOLONE ACETATE 160 MG: 80 INJECTION, SUSPENSION INTRA-ARTICULAR; INTRALESIONAL; INTRAMUSCULAR; SOFT TISSUE at 08:35

## 2019-09-05 NOTE — PROGRESS NOTES
FOLLOW UP VISIT    Patient: Pearl Temple  ?  YOB: 1955    MRN: [id number]  ?  Chief Complaint   Patient presents with   • Left Hip - Follow-up   • Left Thumb - Follow-up      ?  HPI: The patient continues to have pain and discomfort on the left thumb.  She finds it difficult to make a  powerfully.  She has a lot of pain at the base of her thumb at the CMC joint.  She feels that she is progressively losing strength because of the arthritis at the base of the thumb.  She states that the pain is quite severe and affects her quality of life on a day-to-day basis.  Her hips are bothering him significantly as well.  The left is more symptomatic than the right.  She has difficulty in turning over in bed at night.  She does not have any risk factors for avascular necrosis.  The pain is at the base of the greater trochanter and radiates to the proximal femur.    Pain Location: left hip(s) and left wrist  Radiation: From the base of the greater trochanter to the proximal femur.  Quality: dull, aching  Intensity/Severity: moderate  Duration: Several month(s)  Onset quality: gradual   Timing: constant  Aggravating Factors: going up and down stairs, rising after sitting, walking/running, squatting, walking or standing for prolonged time  Alleviating Factors: OTC analgesics, NSAID's, use of brace  Previous Episodes: yes  Associated Symptoms: swelling, decreased ROM, decreased strength  ADLs Affected: grooming/hygiene/toileting/personal care, dressing, ambulating, recreational activities/sports  Previous Treatment: nsaids and steroid injections.    This patient is an established patient.  This problem is not new to this examiner.      Allergies:   Allergies   Allergen Reactions   • Codeine    • Dye Fdc Red [Red Dye]    • Levaquin [Levofloxacin]    • Lortab [Hydrocodone-Acetaminophen]    • Morphine And Related    • Penicillins    • Robaxin [Methocarbamol]    • Toradol [Ketorolac Tromethamine]    • Valium  [Diazepam]        Medications:   Home Medications:  Current Outpatient Medications on File Prior to Visit   Medication Sig   • ALREX 0.2 % suspension    • COMBIVENT RESPIMAT  MCG/ACT inhaler    • diclofenac (VOLTAREN) 1 % gel gel Apply 4 g topically 2 (Two) Times a Day.   • doxycycline (MONODOX) 100 MG capsule    • esomeprazole (nexIUM) 40 MG capsule    • FARXIGA 5 MG tablet tablet    • ferrous gluconate (FERGON) 324 MG tablet    • fluconazole (DIFLUCAN) 150 MG tablet    • JANUVIA 100 MG tablet    • JARDIANCE 25 MG tablet    • metFORMIN (GLUCOPHAGE) 1000 MG tablet    • montelukast (SINGULAIR) 10 MG tablet    • nystatin (MYCOSTATIN) 519613 UNIT/ML suspension    • ranitidine (ZANTAC) 300 MG capsule    • ranitidine (ZANTAC) 300 MG tablet    • TRULICITY 0.75 MG/0.5ML solution pen-injector      No current facility-administered medications on file prior to visit.      Current Medications:  Scheduled Meds:  PRN Meds:.    I have reviewed the patient's medical history in detail and updated the computerized patient record.  Review and summarization of old records include:    Past Medical History:   Diagnosis Date   • Acid reflux    • Diabetes (CMS/HCC)      Past Surgical History:   Procedure Laterality Date   • APPENDECTOMY     • CHOLECYSTECTOMY     • FOOT SURGERY     • HYSTERECTOMY     • KNEE SURGERY       Social History     Occupational History   • Not on file   Tobacco Use   • Smoking status: Never Smoker   Substance and Sexual Activity   • Alcohol use: No   • Drug use: Not on file   • Sexual activity: Not on file      Social History     Social History Narrative   • Not on file     Family History   Problem Relation Age of Onset   • Alzheimer's disease Mother    • Cancer Father          Review of Systems   Constitutional: Negative.    HENT: Negative.    Eyes: Negative.    Respiratory: Negative.    Cardiovascular: Negative.    Gastrointestinal: Negative.    Endocrine: Negative.    Genitourinary: Negative.   "  Musculoskeletal: Positive for arthralgias and gait problem.   Skin: Negative.    Allergic/Immunologic: Negative.    Hematological: Negative.    Psychiatric/Behavioral: Negative.           Wt Readings from Last 3 Encounters:   03/12/19 69.4 kg (153 lb)     Ht Readings from Last 3 Encounters:   03/12/19 160 cm (63\")     There is no height or weight on file to calculate BMI.  No height and weight on file for this encounter.  There were no vitals filed for this visit.      Physical Exam  Constitutional: Patient is oriented to person, place, and time. Appears well-developed and well-nourished.   HENT:   Head: Normocephalic and atraumatic.   Eyes: Conjunctivae and EOM are normal. Pupils are equal, round, and reactive to light.   Cardiovascular: Normal rate, regular rhythm, normal heart sounds and intact distal pulses.   Pulmonary/Chest: Effort normal and breath sounds normal.   Musculoskeletal:   See detailed exam below   Neurological: Alert and oriented to person, place, and time. No sensory deficit. Coordination normal.   Skin: Skin is warm and dry. Capillary refill takes less than 2 seconds. No rash noted. No erythema.   Psychiatric: Patient has a normal mood and affect. Her behavior is normal. Judgment and thought content normal.   Nursing note and vitals reviewed.      Ortho Exam:   Left hip (gtb). Skin and soft tissues over the greater trochanteric bursa are painful and tender for the patient. Neurovascular status is intact. IT band is painful and tender. Cross body adduction bothers the patient significantly. Internal and external rotations bother the patient significantly with tenderness over the greater trochanter. There is no clinical deformity. No shortening. The patient does have a significant limp because by the trochanteric pain caused by the abductors. The piriformis is tight and with any rotation there is significant capsular tightness. Dorsalis pedis and posterior tibial artery pulses are palpable. " Capillary refill is 2 seconds with a brisk return. Common peroneal nerve function is well preserved.  Left hand-CMC joint. The patient is right hand dominant. Tenderness is present at base of the 1st metacarpal. Skin and soft tissues are mildly swollen. Flexor and extensor tendon function is well preserved. Capillary refill is 2 seconds with a brisk return. Axial lading of the joint causes crepitus and pain. Pinch strength is compromised. Slight subluxation of the 1st metacarpal base is noted. Neurovascular status is intact.      Diagnostics:  no diagnostic testing performed this visit      Assessment:  Pearl was seen today for follow-up and follow-up.    Diagnoses and all orders for this visit:    Arthritis of carpometacarpal (CMC) joint of left thumb  -     Small Joint Arthrocentesis: L thumb CMC    Greater trochanteric bursitis of both hips  -     Large Joint Arthrocentesis: L greater trochanteric bursa          Large Joint Arthrocentesis: L greater trochanteric bursa  Date/Time: 9/5/2019 8:35 AM  Consent given by: patient  Site marked: site marked  Timeout: Immediately prior to procedure a time out was called to verify the correct patient, procedure, equipment, support staff and site/side marked as required   Supporting Documentation  Indications: pain   Procedure Details  Location: hip - L greater trochanteric bursa  Preparation: Patient was prepped and draped in the usual sterile fashion  Needle size: 25 G  Approach: anteromedial  Medications administered: 160 mg methylPREDNISolone acetate 80 MG/ML; 2 mL lidocaine PF 1% 1 %  Patient tolerance: patient tolerated the procedure well with no immediate complications    Small Joint Arthrocentesis: L thumb CMC  Consent given by: patient  Site marked: site marked  Timeout: Immediately prior to procedure a time out was called to verify the correct patient, procedure, equipment, support staff and site/side marked as required   Supporting Documentation  Indications: pain    Procedure Details  Location: thumb - L thumb CMC  Preparation: Patient was prepped and draped in the usual sterile fashion  Needle size: 27 G  Medications administered: 160 mg methylPREDNISolone acetate 80 MG/ML; 2 mL lidocaine PF 1% 1 %  Patient tolerance: patient tolerated the procedure well with no immediate complications        ?    Plan    · Compression/brace to the thumb to support the CMC joint function.  · Avoid repetitive loading and lifting with the upper extremity.  · Stretching exercises involving the IT band and the hip flexors.  · Discussed with the patient about application of an anti-inflammatory salve to the lateral aspect of the greater trochanter to help manage the symptoms.  · Rest, ice, compression, and elevation (RICE) therapy  · Stretching and strengthening exercises of the flexors and abductors of the thumb.  · Discussed with the patient about the role of partial trapezoidectomy and interposition arthroplasty for the CMC joint arthritis if her symptoms continue to proceed and cause her a lot of negativity in her quality of life.  · Tylenol 500-1000mg by mouth every 6 hours as needed for pain   · Follow up in 3 month(s)    Date of encounter: 09/05/2019   Honorio Hayes MD

## 2019-09-08 RX ORDER — METHYLPREDNISOLONE ACETATE 80 MG/ML
160 INJECTION, SUSPENSION INTRA-ARTICULAR; INTRALESIONAL; INTRAMUSCULAR; SOFT TISSUE
Status: COMPLETED | OUTPATIENT
Start: 2019-09-05 | End: 2019-09-05

## 2019-09-08 RX ORDER — LIDOCAINE HYDROCHLORIDE 10 MG/ML
2 INJECTION, SOLUTION EPIDURAL; INFILTRATION; INTRACAUDAL; PERINEURAL
Status: COMPLETED | OUTPATIENT
Start: 2019-09-05 | End: 2019-09-05

## 2020-01-30 ENCOUNTER — CLINICAL SUPPORT (OUTPATIENT)
Dept: ORTHOPEDIC SURGERY | Facility: CLINIC | Age: 65
End: 2020-01-30

## 2020-01-30 VITALS — BODY MASS INDEX: 26.05 KG/M2 | WEIGHT: 152.6 LBS | HEIGHT: 64 IN

## 2020-01-30 DIAGNOSIS — M18.12 ARTHRITIS OF CARPOMETACARPAL (CMC) JOINT OF LEFT THUMB: ICD-10-CM

## 2020-01-30 DIAGNOSIS — G89.29 CHRONIC LEFT SHOULDER PAIN: Primary | ICD-10-CM

## 2020-01-30 DIAGNOSIS — M25.512 CHRONIC LEFT SHOULDER PAIN: Primary | ICD-10-CM

## 2020-01-30 DIAGNOSIS — M25.552 LEFT HIP PAIN: ICD-10-CM

## 2020-01-30 PROCEDURE — 20610 DRAIN/INJ JOINT/BURSA W/O US: CPT | Performed by: ORTHOPAEDIC SURGERY

## 2020-01-30 PROCEDURE — 20600 DRAIN/INJ JOINT/BURSA W/O US: CPT | Performed by: ORTHOPAEDIC SURGERY

## 2020-01-30 PROCEDURE — 99212 OFFICE O/P EST SF 10 MIN: CPT | Performed by: ORTHOPAEDIC SURGERY

## 2020-01-30 RX ADMIN — LIDOCAINE HYDROCHLORIDE 2 ML: 10 INJECTION, SOLUTION EPIDURAL; INFILTRATION; INTRACAUDAL; PERINEURAL at 13:00

## 2020-01-30 RX ADMIN — METHYLPREDNISOLONE ACETATE 160 MG: 80 INJECTION, SUSPENSION INTRA-ARTICULAR; INTRALESIONAL; INTRAMUSCULAR; SOFT TISSUE at 12:59

## 2020-01-30 RX ADMIN — METHYLPREDNISOLONE ACETATE 160 MG: 80 INJECTION, SUSPENSION INTRA-ARTICULAR; INTRALESIONAL; INTRAMUSCULAR; SOFT TISSUE at 13:00

## 2020-01-30 RX ADMIN — LIDOCAINE HYDROCHLORIDE 2 ML: 10 INJECTION, SOLUTION EPIDURAL; INFILTRATION; INTRACAUDAL; PERINEURAL at 12:59

## 2020-02-01 PROBLEM — M25.552 LEFT HIP PAIN: Status: ACTIVE | Noted: 2020-02-01

## 2020-02-01 RX ORDER — METHYLPREDNISOLONE ACETATE 80 MG/ML
160 INJECTION, SUSPENSION INTRA-ARTICULAR; INTRALESIONAL; INTRAMUSCULAR; SOFT TISSUE
Status: COMPLETED | OUTPATIENT
Start: 2020-01-30 | End: 2020-01-30

## 2020-02-01 RX ORDER — LIDOCAINE HYDROCHLORIDE 10 MG/ML
2 INJECTION, SOLUTION EPIDURAL; INFILTRATION; INTRACAUDAL; PERINEURAL
Status: COMPLETED | OUTPATIENT
Start: 2020-01-30 | End: 2020-01-30

## 2020-04-30 ENCOUNTER — CLINICAL SUPPORT (OUTPATIENT)
Dept: ORTHOPEDIC SURGERY | Facility: CLINIC | Age: 65
End: 2020-04-30

## 2020-04-30 VITALS — TEMPERATURE: 97.3 F

## 2020-04-30 DIAGNOSIS — M70.62 GREATER TROCHANTERIC BURSITIS OF BOTH HIPS: ICD-10-CM

## 2020-04-30 DIAGNOSIS — M79.645 THUMB PAIN, LEFT: Primary | ICD-10-CM

## 2020-04-30 DIAGNOSIS — M25.552 LEFT HIP PAIN: ICD-10-CM

## 2020-04-30 DIAGNOSIS — M70.61 GREATER TROCHANTERIC BURSITIS OF BOTH HIPS: ICD-10-CM

## 2020-04-30 PROCEDURE — 20610 DRAIN/INJ JOINT/BURSA W/O US: CPT | Performed by: ORTHOPAEDIC SURGERY

## 2020-04-30 PROCEDURE — 20600 DRAIN/INJ JOINT/BURSA W/O US: CPT | Performed by: ORTHOPAEDIC SURGERY

## 2020-04-30 RX ORDER — LIDOCAINE HYDROCHLORIDE 10 MG/ML
2 INJECTION, SOLUTION EPIDURAL; INFILTRATION; INTRACAUDAL; PERINEURAL
Status: COMPLETED | OUTPATIENT
Start: 2020-04-30 | End: 2020-04-30

## 2020-04-30 RX ORDER — METHYLPREDNISOLONE ACETATE 80 MG/ML
160 INJECTION, SUSPENSION INTRA-ARTICULAR; INTRALESIONAL; INTRAMUSCULAR; SOFT TISSUE
Status: COMPLETED | OUTPATIENT
Start: 2020-04-30 | End: 2020-04-30

## 2020-04-30 RX ADMIN — LIDOCAINE HYDROCHLORIDE 2 ML: 10 INJECTION, SOLUTION EPIDURAL; INFILTRATION; INTRACAUDAL; PERINEURAL at 13:37

## 2020-04-30 RX ADMIN — METHYLPREDNISOLONE ACETATE 160 MG: 80 INJECTION, SUSPENSION INTRA-ARTICULAR; INTRALESIONAL; INTRAMUSCULAR; SOFT TISSUE at 13:37

## 2020-04-30 NOTE — PROGRESS NOTES
INJECTION    Patient: Pearl Temple    YOB: 1955    MRN: [id number]    Chief Complaint   Patient presents with   • Left Hip - Follow-up, Pain   • Left Hand - Follow-up, Pain       History of Present Illness: Patient returns for follow-up on hip pain. The pain is over the lateral aspect of the left hip at the greater trochanteric bursa.  Her pain has been progressive in nature and remains intermittent .   Her pain is worsened  rising after sitting, squatting. There has been improvement in the past with heat and injections.  Patient is seen in the office today for left finger pain at Thumb CMC. The pain is over the Ventral aspect . It has been progressive in nature but remains constant. Worsened by repetitive motion using keyboard lifting objects. Patient denies numbness and tingling. has had improvement in the past with heat and injections.      This problem is not new to this examiner.     Allergies:   Allergies   Allergen Reactions   • Codeine    • Dye Fdc Red [Red Dye]    • Levaquin [Levofloxacin]    • Lortab [Hydrocodone-Acetaminophen]    • Morphine And Related    • Penicillins    • Robaxin [Methocarbamol]    • Toradol [Ketorolac Tromethamine]    • Valium [Diazepam]        Medications:   Home Medications:  Current Outpatient Medications on File Prior to Visit   Medication Sig   • ALREX 0.2 % suspension    • COMBIVENT RESPIMAT  MCG/ACT inhaler    • diclofenac (VOLTAREN) 1 % gel gel Apply 4 g topically 2 (Two) Times a Day.   • doxycycline (MONODOX) 100 MG capsule    • esomeprazole (nexIUM) 40 MG capsule    • FARXIGA 5 MG tablet tablet    • ferrous gluconate (FERGON) 324 MG tablet    • fluconazole (DIFLUCAN) 150 MG tablet    • JANUVIA 100 MG tablet    • JARDIANCE 25 MG tablet    • metFORMIN (GLUCOPHAGE) 1000 MG tablet    • montelukast (SINGULAIR) 10 MG tablet    • nystatin (MYCOSTATIN) 816908 UNIT/ML suspension    • ranitidine (ZANTAC) 300 MG capsule    • ranitidine (ZANTAC) 300 MG tablet    •  "TRULICITY 0.75 MG/0.5ML solution pen-injector      No current facility-administered medications on file prior to visit.      Current Medications:  Scheduled Meds:  PRN Meds:.    I have reviewed the patient's medical history in detail and updated the computerized patient record.  Review and summarization of old records include:    Past Medical History:   Diagnosis Date   • Acid reflux    • Diabetes (CMS/HCC)      Past Surgical History:   Procedure Laterality Date   • APPENDECTOMY     • CHOLECYSTECTOMY     • FOOT SURGERY     • HYSTERECTOMY     • KNEE SURGERY       Social History     Occupational History   • Not on file   Tobacco Use   • Smoking status: Never Smoker   Substance and Sexual Activity   • Alcohol use: No   • Drug use: Not on file   • Sexual activity: Not on file      Social History     Social History Narrative   • Not on file     Family History   Problem Relation Age of Onset   • Alzheimer's disease Mother    • Cancer Father        Review of Systems        Wt Readings from Last 3 Encounters:   01/30/20 69.2 kg (152 lb 9.6 oz)   03/12/19 69.4 kg (153 lb)     Ht Readings from Last 3 Encounters:   01/30/20 162.6 cm (64\")   03/12/19 160 cm (63\")     There is no height or weight on file to calculate BMI.  No height and weight on file for this encounter.  Vitals:    04/30/20 1336   Temp: 97.3 °F (36.3 °C)       Physical Exam    Musculoskeletal:    Left hip (gtb): Skin and soft tissues over the greater trochanteric bursa are tender upon palpation, along with IT band tenderness. Cross body adduction is negative. Internal and external rotations are bothersome and cause tenderness over the greater trochanter. There is no clinical deformity and no shortening. She does have a limp upon walking. There is piriformis tightness and there  is capsular tightness with any rotation. Dorsalis pedis and posterior tibial artery pulses are palpable. Neurovascular status is intact and capillary refill is less than 2 seconds. " Common peroneal nerve function is well preserved.  Left hand-CMC joint. The patient is right hand dominant. Tenderness is present at base of the 1st metacarpal. Skin and soft tissues are mildly swollen. Flexor and extensor tendon function is well preserved. Capillary refill is 2 seconds with a brisk return. Axial lading of the joint causes crepitus and pain. Pinch strength is compromised. Slight subluxation of the 1st metacarpal base is noted. Neurovascular status is intact.      Diagnostics:      Procedure:  Small Joint Arthrocentesis: L thumb CMC  Consent given by: patient  Site marked: site marked  Timeout: Immediately prior to procedure a time out was called to verify the correct patient, procedure, equipment, support staff and site/side marked as required   Supporting Documentation  Indications: pain   Procedure Details  Location: thumb - L thumb CMC  Preparation: Patient was prepped and draped in the usual sterile fashion  Needle size: 27 G  Medications administered: 160 mg methylPREDNISolone acetate 80 MG/ML; 2 mL lidocaine PF 1% 1 %  Patient tolerance: patient tolerated the procedure well with no immediate complications    Large Joint Arthrocentesis: L greater trochanteric bursa  Date/Time: 4/30/2020 1:37 PM  Consent given by: patient  Site marked: site marked  Timeout: Immediately prior to procedure a time out was called to verify the correct patient, procedure, equipment, support staff and site/side marked as required   Supporting Documentation  Indications: pain   Procedure Details  Location: hip - L greater trochanteric bursa  Preparation: Patient was prepped and draped in the usual sterile fashion  Needle size: 26 G  Medications administered: 2 mL lidocaine PF 1% 1 %; 160 mg methylPREDNISolone acetate 80 MG/ML  Patient tolerance: patient tolerated the procedure well with no immediate complications            Assessment:   Pearl was seen today for follow-up, pain, follow-up and pain.    Diagnoses and all  orders for this visit:    Thumb pain, left  -     Small Joint Arthrocentesis: L thumb CMC    Left hip pain  -     Large Joint Arthrocentesis: L greater trochanteric bursa    Greater trochanteric bursitis of both hips          Plan:   · Injected patient's left hip-greater trochanteric bursa and CMC-thumb joint(s)with steroid from an anterolateral approach   · Compression/brace   · Rest, ice, compression, and elevation (RICE) therapy  · OTC Tylenol 500-1000mg by mouth every 6 hours as needed for pain   · Follow up in 3 month(s)    Date of encounter: 04/30/2020   Honorio Hayes MD

## 2020-07-16 DIAGNOSIS — M79.645 THUMB PAIN, LEFT: ICD-10-CM

## 2020-07-16 DIAGNOSIS — M25.552 LEFT HIP PAIN: Primary | ICD-10-CM

## 2020-08-13 ENCOUNTER — HOSPITAL ENCOUNTER (OUTPATIENT)
Dept: OTHER | Facility: HOSPITAL | Age: 65
Discharge: HOME | End: 2020-08-13
Attending: ORTHOPAEDIC SURGERY

## 2020-08-13 ENCOUNTER — CLINICAL SUPPORT (OUTPATIENT)
Dept: ORTHOPEDIC SURGERY | Facility: CLINIC | Age: 65
End: 2020-08-13

## 2020-08-13 VITALS — TEMPERATURE: 96.9 F | HEIGHT: 63 IN | WEIGHT: 155 LBS | BODY MASS INDEX: 27.46 KG/M2

## 2020-08-13 DIAGNOSIS — M18.0 PRIMARY OSTEOARTHRITIS OF BOTH FIRST CARPOMETACARPAL JOINTS: ICD-10-CM

## 2020-08-13 DIAGNOSIS — M70.62 GREATER TROCHANTERIC BURSITIS OF BOTH HIPS: ICD-10-CM

## 2020-08-13 DIAGNOSIS — M79.645 THUMB PAIN, LEFT: Primary | ICD-10-CM

## 2020-08-13 DIAGNOSIS — M70.61 GREATER TROCHANTERIC BURSITIS OF BOTH HIPS: ICD-10-CM

## 2020-08-13 PROCEDURE — 20610 DRAIN/INJ JOINT/BURSA W/O US: CPT | Performed by: ORTHOPAEDIC SURGERY

## 2020-08-13 PROCEDURE — 99213 OFFICE O/P EST LOW 20 MIN: CPT | Performed by: ORTHOPAEDIC SURGERY

## 2020-08-13 RX ORDER — METHYLPREDNISOLONE ACETATE 80 MG/ML
160 INJECTION, SUSPENSION INTRA-ARTICULAR; INTRALESIONAL; INTRAMUSCULAR; SOFT TISSUE
Status: COMPLETED | OUTPATIENT
Start: 2020-08-13 | End: 2020-08-13

## 2020-08-13 RX ORDER — LIDOCAINE HYDROCHLORIDE 10 MG/ML
2 INJECTION, SOLUTION INFILTRATION; PERINEURAL
Status: COMPLETED | OUTPATIENT
Start: 2020-08-13 | End: 2020-08-13

## 2020-08-13 RX ADMIN — LIDOCAINE HYDROCHLORIDE 2 ML: 10 INJECTION, SOLUTION INFILTRATION; PERINEURAL at 15:51

## 2020-08-13 RX ADMIN — METHYLPREDNISOLONE ACETATE 160 MG: 80 INJECTION, SUSPENSION INTRA-ARTICULAR; INTRALESIONAL; INTRAMUSCULAR; SOFT TISSUE at 15:51

## 2020-08-13 NOTE — PROGRESS NOTES
FOLLOW UP VISIT    Patient: Pearl Temple  ?  YOB: 1955    MRN: [id number]  ?  Chief Complaint   Patient presents with   • Left Hip - Follow-up   • Left Thumb - Follow-up      ?  HPI:   Pearl Arriaga presents to the office with complaints of left hip and left thumb pain.  She has had longstanding CMC joint arthrosis of the thumb.  She has difficulty with fine motor activities.  Axial loading of the thumb bothers her significantly.  She is helping her daughter to can some vegetables at this point and therefore is unable to take a steroid injection to the left thumb.  She states that the left thumb injection always bothers her for a few days before it makes her symptoms get better.  She is having a lot of pain and discomfort in the left hip as well.  The pain is centered over the greater trochanteric bursa.  Cross body activities bother the patient significantly.  She does not have any risk factors for avascular necrosis.  Her symptoms do appear to be related to radiculopathy of the left lower extremity starting at the hip.      Pain Location: LEFT hip and LEFT thumb finger  Radiation: none  Quality: dull, aching  Intensity/Severity: moderate  Duration: Several months  Onset quality: gradual   Timing: intermittent  Aggravating Factors: squatting  Alleviating Factors: oral pain medication  Previous Episodes: yes  Associated Symptoms: pain, swelling  ADLs Affected: ambulating  Previous Treatment: Intra-articular injections of steroid.    This patient is an established patient.  This problem is not new to this examiner.      Allergies:   Allergies   Allergen Reactions   • Codeine    • Dye Fdc Red [Red Dye]    • Levaquin [Levofloxacin]    • Lortab [Hydrocodone-Acetaminophen]    • Morphine And Related    • Penicillins    • Robaxin [Methocarbamol]    • Toradol [Ketorolac Tromethamine]    • Valium [Diazepam]        Medications:   Home Medications:  Current Outpatient Medications on File Prior to Visit    Medication Sig   • ALREX 0.2 % suspension    • COMBIVENT RESPIMAT  MCG/ACT inhaler    • diclofenac (VOLTAREN) 1 % gel gel Apply 4 g topically 2 (Two) Times a Day.   • esomeprazole (nexIUM) 40 MG capsule    • FARXIGA 5 MG tablet tablet    • ferrous gluconate (FERGON) 324 MG tablet    • fluconazole (DIFLUCAN) 150 MG tablet    • JARDIANCE 25 MG tablet    • montelukast (SINGULAIR) 10 MG tablet    • nystatin (MYCOSTATIN) 828773 UNIT/ML suspension    • SITagliptin-metFORMIN HCl (JANUMET PO) Take  by mouth 2 (two) times a day.   • [DISCONTINUED] doxycycline (MONODOX) 100 MG capsule    • [DISCONTINUED] JANUVIA 100 MG tablet    • [DISCONTINUED] metFORMIN (GLUCOPHAGE) 1000 MG tablet    • [DISCONTINUED] ranitidine (ZANTAC) 300 MG capsule    • [DISCONTINUED] ranitidine (ZANTAC) 300 MG tablet    • [DISCONTINUED] TRULICITY 0.75 MG/0.5ML solution pen-injector      No current facility-administered medications on file prior to visit.      Current Medications:  Scheduled Meds:  PRN Meds:.    I have reviewed the patient's medical history in detail and updated the computerized patient record.  Review and summarization of old records include:    Past Medical History:   Diagnosis Date   • Acid reflux    • Diabetes (CMS/HCC)      Past Surgical History:   Procedure Laterality Date   • APPENDECTOMY     • CHOLECYSTECTOMY     • FOOT SURGERY     • HYSTERECTOMY     • KNEE SURGERY       Social History     Occupational History   • Not on file   Tobacco Use   • Smoking status: Never Smoker   Substance and Sexual Activity   • Alcohol use: No   • Drug use: Not on file   • Sexual activity: Not on file      Family History   Problem Relation Age of Onset   • Alzheimer's disease Mother    • Cancer Father          Review of Systems   Constitutional: Negative.    HENT: Negative.    Eyes: Negative.    Respiratory: Negative.    Cardiovascular: Negative.    Gastrointestinal: Negative.    Endocrine: Negative.    Genitourinary: Negative.   "  Musculoskeletal: Positive for arthralgias and gait problem.   Skin: Negative.    Allergic/Immunologic: Negative.    Hematological: Negative.    Psychiatric/Behavioral: Negative.           Wt Readings from Last 3 Encounters:   08/13/20 70.3 kg (155 lb)   01/30/20 69.2 kg (152 lb 9.6 oz)   03/12/19 69.4 kg (153 lb)     Ht Readings from Last 3 Encounters:   08/13/20 160 cm (63\")   01/30/20 162.6 cm (64\")   03/12/19 160 cm (63\")     Body mass index is 27.46 kg/m².  Facility age limit for growth percentiles is 20 years.  Vitals:    08/13/20 1451   Temp: 96.9 °F (36.1 °C)         Physical Exam  Constitutional: Patient is oriented to person, place, and time. Appears well-developed and well-nourished.   HENT:   Head: Normocephalic and atraumatic.   Eyes: Conjunctivae and EOM are normal. Pupils are equal, round, and reactive to light.   Cardiovascular: Normal rate, regular rhythm, normal heart sounds and intact distal pulses.   Pulmonary/Chest: Effort normal and breath sounds normal.   Musculoskeletal:   See detailed exam below   Neurological: Alert and oriented to person, place, and time. No sensory deficit. Coordination normal.   Skin: Skin is warm and dry. Capillary refill takes less than 2 seconds. No rash noted. No erythema.   Psychiatric: Patient has a normal mood and affect. Her behavior is normal. Judgment and thought content normal.   Nursing note and vitals reviewed.      Ortho Exam:   Left hip (gtb): Skin and soft tissues over the greater trochanteric bursa are tender upon palpation, along with IT band tenderness. Cross body adduction is negative. Internal and external rotations are bothersome and cause tenderness over the greater trochanter. There is no clinical deformity and no shortening. She does have a limp upon walking. There is piriformis tightness and there  is capsular tightness with any rotation. Dorsalis pedis and posterior tibial artery pulses are palpable. Neurovascular status is intact and " capillary refill is less than 2 seconds. Common peroneal nerve function is well preserved.  Left hand-CMC joint. The patient is right hand dominant. Tenderness is present at base of the 1st metacarpal. Skin and soft tissues are mildly swollen. Flexor and extensor tendon function is well preserved. Capillary refill is 2 seconds with a brisk return. Axial lading of the joint causes crepitus and pain. Pinch strength is compromised. Slight subluxation of the 1st metacarpal base is noted. Neurovascular status is intact.        Diagnostics:  Left thumb and left hip xrays ap/lateral views were ordered by Honorio Hayes MD and performed at [facility]. These images were independently viewed and interpreted by myself, my impression as follows:  left Hip X-Ray  Indication: Pain and discomfort on the lateral aspect of the hip.  AP and lateral  Findings: Early degenerative change with slight narrowing of the lateral joint space.  no bony lesion  Soft tissues within normal limits  decreased joint spaces  Hardware appropriately positioned not applicable      yes prior studies available for comparison.    X-RAY was ordered and reviewed by Honorio Hayes MD    left Hand X-Ray  Indication: Pain and discomfort at the base of the CMC joint.  AP, Lateral views  Findings: Advanced degenerative osteoarthritis with complete loss of joint space.  no bony lesion  Soft tissues within normal limits  decreased joint spaces  Hardware appropriately positioned not applicable      yes prior studies available for comparison.    X-RAY was ordered and reviewed by Honorio Hayes MD      Assessment:  Pearl was seen today for follow-up and follow-up.    Diagnoses and all orders for this visit:    Thumb pain, left    Primary osteoarthritis of both first carpometacarpal joints    Greater trochanteric bursitis of both hips  -     Large Joint Arthrocentesis: L greater trochanteric bursa          Large Joint Arthrocentesis: L greater  trochanteric bursa  Date/Time: 8/13/2020 3:51 PM  Consent given by: patient  Site marked: site marked  Timeout: Immediately prior to procedure a time out was called to verify the correct patient, procedure, equipment, support staff and site/side marked as required   Supporting Documentation  Indications: pain   Procedure Details  Location: hip - L greater trochanteric bursa  Preparation: Patient was prepped and draped in the usual sterile fashion  Needle size: 25 G  Approach: anteromedial  Medications administered: 2 mL lidocaine 1 %; 160 mg methylPREDNISolone acetate 80 MG/ML  Patient tolerance: patient tolerated the procedure well with no immediate complications        ?    Plan    · Compression/brace  · Rest, ice, compression, and elevation (RICE) therapy  · Stretching and strengthening exercises  · Alternate Ibuprofen and Tylenol as needed  · Follow up in 3 month(s)    Date of encounter: 08/13/2020   Honorio Hayes MD

## 2020-11-19 ENCOUNTER — CLINICAL SUPPORT (OUTPATIENT)
Dept: ORTHOPEDIC SURGERY | Facility: CLINIC | Age: 65
End: 2020-11-19

## 2020-11-19 VITALS — HEIGHT: 64 IN | BODY MASS INDEX: 26.46 KG/M2 | TEMPERATURE: 97.8 F | WEIGHT: 155 LBS

## 2020-11-19 DIAGNOSIS — M77.8 DELTOID TENDINITIS OF LEFT SHOULDER: ICD-10-CM

## 2020-11-19 DIAGNOSIS — M18.12 ARTHRITIS OF CARPOMETACARPAL (CMC) JOINT OF LEFT THUMB: Primary | ICD-10-CM

## 2020-11-19 DIAGNOSIS — M25.552 LEFT HIP PAIN: ICD-10-CM

## 2020-11-19 PROCEDURE — 99213 OFFICE O/P EST LOW 20 MIN: CPT | Performed by: ORTHOPAEDIC SURGERY

## 2020-11-19 RX ORDER — MAGNESIUM OXIDE 400 MG/1
TABLET ORAL
COMMUNITY
Start: 2020-11-09

## 2020-11-19 RX ORDER — INFLUENZA A VIRUS A/MICHIGAN/45/2015 X-275 (H1N1) ANTIGEN (FORMALDEHYDE INACTIVATED), INFLUENZA A VIRUS A/SINGAPORE/INFIMH-16-0019/2016 IVR-186 (H3N2) ANTIGEN (FORMALDEHYDE INACTIVATED), INFLUENZA B VIRUS B/PHUKET/3073/2013 ANTIGEN (FORMALDEHYDE INACTIVATED), AND INFLUENZA B VIRUS B/MARYLAND/15/2016 BX-69A ANTIGEN (FORMALDEHYDE INACTIVATED) 60; 60; 60; 60 UG/.7ML; UG/.7ML; UG/.7ML; UG/.7ML
INJECTION, SUSPENSION INTRAMUSCULAR
COMMUNITY
Start: 2020-10-06

## 2020-11-19 RX ORDER — SITAGLIPTIN AND METFORMIN HYDROCHLORIDE 1000; 50 MG/1; MG/1
TABLET, FILM COATED ORAL
COMMUNITY
Start: 2020-11-09

## 2020-11-19 NOTE — PROGRESS NOTES
FOLLOW UP VISIT    Patient: Pearl Temple  ?  YOB: 1955    MRN: [id number]  ?  Chief Complaint   Patient presents with   • Left Hip - Follow-up   • Left Thumb - Follow-up      ?  HPI:   Pearl Arriaga presents back to the office with pain and discomfort on the base of her left thumb CMC joint.  She has difficulty with rotation of the thumb.  Cross body activities bother her quite a bit.  She states that the steroid injections have helped her to some degree and she is feeling a lot better than before.  Her left shoulder has also responded well to manipulation followed by aggressive range of motion exercises.  She states that it is her left hip that has been bothering her the most.  We have discussed about the underlying osteoarthritis of the hip and we are talking about getting an MRI for the patient.      Pain Location: LEFT hip  left thumb  Radiation: none  Quality: dull, aching, sharp  Intensity/Severity: mild-moderate  Duration: several years  Onset quality: gradual   Timing: intermittent  Aggravating Factors: kneeling, rising after sitting  Alleviating Factors: Tylenol, steroid injection (intra-articular)  Previous Episodes: yes  Associated Symptoms: pain, swelling, clicking/popping  ADLs Affected: ambulating, recreational activities/sports  Previous Treatment: Anti-inflammatory medication and intra-articular injections of steroid.    This patient is an established patient.  This problem is not new to this examiner.      Allergies:   Allergies   Allergen Reactions   • Codeine    • Dye Fdc Red [Red Dye]    • Levaquin [Levofloxacin]    • Lortab [Hydrocodone-Acetaminophen]    • Morphine And Related    • Penicillins    • Robaxin [Methocarbamol]    • Toradol [Ketorolac Tromethamine]    • Valium [Diazepam]        Medications:   Home Medications:  Current Outpatient Medications on File Prior to Visit   Medication Sig   • ALREX 0.2 % suspension    • COMBIVENT RESPIMAT  MCG/ACT inhaler    •  diclofenac (VOLTAREN) 1 % gel gel Apply 4 g topically 2 (Two) Times a Day.   • esomeprazole (nexIUM) 40 MG capsule    • FARXIGA 5 MG tablet tablet    • ferrous gluconate (FERGON) 324 MG tablet    • fluconazole (DIFLUCAN) 150 MG tablet    • Fluzone High-Dose Quadrivalent 0.7 ML suspension prefilled syringe    • Janumet  MG per tablet    • JARDIANCE 25 MG tablet    • magnesium oxide (MAG-OX) 400 MG tablet    • montelukast (SINGULAIR) 10 MG tablet    • nystatin (MYCOSTATIN) 681584 UNIT/ML suspension      No current facility-administered medications on file prior to visit.      Current Medications:  Scheduled Meds:  PRN Meds:.    I have reviewed the patient's medical history in detail and updated the computerized patient record.  Review and summarization of old records include:    Past Medical History:   Diagnosis Date   • Acid reflux    • Diabetes (CMS/HCC)      Past Surgical History:   Procedure Laterality Date   • APPENDECTOMY     • CHOLECYSTECTOMY     • FOOT SURGERY     • HYSTERECTOMY     • KNEE SURGERY       Social History     Occupational History   • Not on file   Tobacco Use   • Smoking status: Never Smoker   Substance and Sexual Activity   • Alcohol use: No   • Drug use: Not on file   • Sexual activity: Not on file      Family History   Problem Relation Age of Onset   • Alzheimer's disease Mother    • Cancer Father          Review of Systems   Constitutional: Negative.  Negative for diaphoresis.   HENT: Negative.    Eyes: Negative.    Respiratory: Negative.    Cardiovascular: Negative.    Gastrointestinal: Negative.    Endocrine: Negative.    Genitourinary: Negative.    Musculoskeletal: Positive for arthralgias and gait problem.   Skin: Negative.    Allergic/Immunologic: Negative.    Hematological: Negative.    Psychiatric/Behavioral: Negative.           Wt Readings from Last 3 Encounters:   11/19/20 70.3 kg (155 lb)   08/13/20 70.3 kg (155 lb)   01/30/20 69.2 kg (152 lb 9.6 oz)     Ht Readings from Last  "3 Encounters:   11/19/20 162.6 cm (64\")   08/13/20 160 cm (63\")   01/30/20 162.6 cm (64\")     Body mass index is 26.61 kg/m².  Facility age limit for growth percentiles is 20 years.  Vitals:    11/19/20 1306   Temp: 97.8 °F (36.6 °C)         Physical Exam  Constitutional: Patient is oriented to person, place, and time. Appears well-developed and well-nourished.   HENT:   Head: Normocephalic and atraumatic.   Eyes: Conjunctivae and EOM are normal. Pupils are equal, round, and reactive to light.   Cardiovascular: Normal rate, regular rhythm, normal heart sounds and intact distal pulses.   Pulmonary/Chest: Effort normal and breath sounds normal.   Musculoskeletal:   See detailed exam below   Neurological: Alert and oriented to person, place, and time. No sensory deficit. Coordination normal.   Skin: Skin is warm and dry. Capillary refill takes less than 2 seconds. No rash noted. No erythema.   Psychiatric: Patient has a normal mood and affect. Her behavior is normal. Judgment and thought content normal.   Nursing note and vitals reviewed.      Ortho Exam:   Left hip (gtb): Skin and soft tissues over the greater trochanteric bursa are tender upon palpation, along with IT band tenderness. Cross body adduction is negative. Internal and external rotations are bothersome and cause tenderness over the greater trochanter. There is no clinical deformity and no shortening. She does have a limp upon walking. There is piriformis tightness and there  is capsular tightness with any rotation. Dorsalis pedis and posterior tibial artery pulses are palpable. Neurovascular status is intact and capillary refill is less than 2 seconds. Common peroneal nerve function is well preserved.  Left hand-CMC joint. The patient is right hand dominant. Tenderness is present at base of the 1st metacarpal. Skin and soft tissues are mildly swollen. Flexor and extensor tendon function is well preserved. Capillary refill is 2 seconds with a brisk return. " Axial lading of the joint causes crepitus and pain. Pinch strength is compromised. Slight subluxation of the 1st metacarpal base is noted. Neurovascular status is intact.        Diagnostics:  no diagnostic testing performed this visit      Assessment:  Diagnoses and all orders for this visit:    1. Arthritis of carpometacarpal (CMC) joint of left thumb (Primary)    2. Left hip pain    3. Deltoid tendinitis of left shoulder          Procedures  ?    Plan    · Compression/brace to support the thumb from repetitive stress and loading.  · Calcium and vitamin D for bone health.  · Intra-articular steroid injection both for the hip bursa as well as thumb discussed and offered to the patient.  · Continue with aggressive range of motion exercises of the shoulder to prevent arthrofibrosis and frozen shoulder.  · Rest, ice, compression, and elevation (RICE) therapy  · Stretching and strengthening exercises  · Patient will need MRI of her left hip and she will call us when she is ready to proceed with that (MRI screening form under media)  · Alternate Ibuprofen and Tylenol as needed  · Follow up in 3 month(s)    Date of encounter: 11/19/2020   Honorio Hayes MD

## 2021-01-08 ENCOUNTER — TELEPHONE (OUTPATIENT)
Dept: ORTHOPEDICS | Facility: OTHER | Age: 66
End: 2021-01-08

## 2021-01-08 DIAGNOSIS — M25.552 LEFT HIP PAIN: Primary | ICD-10-CM

## 2021-01-08 NOTE — TELEPHONE ENCOUNTER
Provider: BELLA   Caller: MARTHA ROMO  Relationship to Patient: SELF  Phone Number: [phone]  Reason for Call: MRI ORDER    Patient seen Dr. Hayes and is inquiring about a MRI that was supposed to have been scheduled after [place] for her right hip. Can you please put order in so she can have before her next visit.    Please advise.

## 2021-01-08 NOTE — TELEPHONE ENCOUNTER
Reached out to patient, I advised the patient at her November visit Dr. Hayes asked that she contact us when she is ready to proceed with her MRI; she states she did but I do not see anything in her chart.     She would like to have the MRI at [facility] she states she can wait until her appointment in Feb 25th appt for results unless it is urgent.

## 2021-01-20 ENCOUNTER — HOSPITAL ENCOUNTER (OUTPATIENT)
Dept: OTHER | Facility: HOSPITAL | Age: 66
Discharge: HOME | End: 2021-01-20
Attending: ORTHOPAEDIC SURGERY

## 2021-02-25 ENCOUNTER — CLINICAL SUPPORT (OUTPATIENT)
Dept: ORTHOPEDIC SURGERY | Facility: CLINIC | Age: 66
End: 2021-02-25

## 2021-02-25 VITALS — WEIGHT: 155 LBS | TEMPERATURE: 97.4 F | HEIGHT: 64 IN | BODY MASS INDEX: 26.46 KG/M2

## 2021-02-25 DIAGNOSIS — M70.61 GREATER TROCHANTERIC BURSITIS OF BOTH HIPS: ICD-10-CM

## 2021-02-25 DIAGNOSIS — M79.645 THUMB PAIN, LEFT: Primary | ICD-10-CM

## 2021-02-25 DIAGNOSIS — M70.62 GREATER TROCHANTERIC BURSITIS OF BOTH HIPS: ICD-10-CM

## 2021-02-25 DIAGNOSIS — M25.552 LEFT HIP PAIN: ICD-10-CM

## 2021-02-25 DIAGNOSIS — M18.12 ARTHRITIS OF CARPOMETACARPAL (CMC) JOINT OF LEFT THUMB: ICD-10-CM

## 2021-02-25 PROCEDURE — 99213 OFFICE O/P EST LOW 20 MIN: CPT | Performed by: ORTHOPAEDIC SURGERY

## 2021-02-25 RX ORDER — ACETAMINOPHEN 160 MG
2000 TABLET,DISINTEGRATING ORAL DAILY
COMMUNITY
Start: 2021-02-08

## 2021-03-11 ENCOUNTER — CLINICAL SUPPORT (OUTPATIENT)
Dept: ORTHOPEDIC SURGERY | Facility: CLINIC | Age: 66
End: 2021-03-11

## 2021-03-11 VITALS — TEMPERATURE: 98 F | HEIGHT: 63 IN | BODY MASS INDEX: 27.46 KG/M2 | WEIGHT: 155 LBS

## 2021-03-11 DIAGNOSIS — M70.61 GREATER TROCHANTERIC BURSITIS OF BOTH HIPS: Primary | ICD-10-CM

## 2021-03-11 DIAGNOSIS — M70.62 GREATER TROCHANTERIC BURSITIS OF BOTH HIPS: Primary | ICD-10-CM

## 2021-03-11 PROCEDURE — 20610 DRAIN/INJ JOINT/BURSA W/O US: CPT | Performed by: ORTHOPAEDIC SURGERY

## 2021-03-11 RX ADMIN — METHYLPREDNISOLONE ACETATE 160 MG: 80 INJECTION, SUSPENSION INTRA-ARTICULAR; INTRALESIONAL; INTRAMUSCULAR; SOFT TISSUE at 13:45

## 2021-03-11 NOTE — PROGRESS NOTES
"Chief Complaint  Follow-up and Pain of the Left Hip and Injections    Subjective    History of Present Illness      Pearl Temple is a 65 y.o. female who presents to [facility] for Patient returns for follow-up on hip pain. The pain is over the lateral aspect of the left hip at the greater trochanteric bursa.  Her pain has been progressive in nature and remains intermittent .   Her pain is worsened  kneeling, rising after sitting. There has been improvement in the past with heat and NSAIDS.      Objective   Vital Signs:   Temp 98 °F (36.7 °C)   Ht 160 cm (63\")   Wt 70.3 kg (155 lb)   BMI 27.46 kg/m²     Physical Exam  65 y.o. female is awake, alert, oriented, in no acute distress and well developed, well nourished.  Ortho Exam   LEFT hip  Left hip (gtb): Skin and soft tissues over the greater trochanteric bursa are tender upon palpation, along with IT band tenderness. Cross body adduction is negative. Internal and external rotations are bothersome and cause tenderness over the greater trochanter. There is no clinical deformity and no shortening. She does have a limp upon walking. There is piriformis tightness and there  is capsular tightness with any rotation. Dorsalis pedis and posterior tibial artery pulses are palpable. Neurovascular status is intact and capillary refill is less than 2 seconds. Common peroneal nerve function is well preserved.      Result Review :                  Large Joint Arthrocentesis: L greater trochanteric bursa  Date/Time: 3/11/2021 1:45 PM  Consent given by: patient  Site marked: site marked  Timeout: Immediately prior to procedure a time out was called to verify the correct patient, procedure, equipment, support staff and site/side marked as required   Supporting Documentation  Indications: pain and joint swelling   Procedure Details  Location: hip - L greater trochanteric bursa  Preparation: Patient was prepped and draped in the usual sterile " fashion  Needle size: 22 G  Approach: anterolateral  Medications administered: 160 mg methylPREDNISolone acetate 80 MG/ML; 2 mL lidocaine (cardiac)  Patient tolerance: patient tolerated the procedure well with no immediate complications               Assessment   Assessment and Plan    Problem List Items Addressed This Visit        Musculoskeletal and Injuries    Greater trochanteric bursitis of both hips - Primary          Follow Up   · Injected patient's left hip-greater trochanteric bursa joint(s)with steroid from an anterolateral approach   · Rest, ice, compression, and elevation (RICE) therapy  · OTC Tylenol 500-1000mg by mouth every 6 hours as needed for pain   · Follow up in 3 month(s)  • Patient was given instructions and counseling regarding her condition or for health maintenance advice. Please see specific information pulled into the AVS if appropriate.     Honorio Hayes MD   Date of Encounter: 3/11/2021        EMR Dragon/Transcription disclaimer:  Much of this encounter note is an electronic transcription/translation of spoken language to printed text. The electronic translation of spoken language may permit erroneous, or at times, nonsensical words or phrases to be inadvertently transcribed; Although I have reviewed the note for such errors, some may still exist.

## 2021-03-19 RX ORDER — METHYLPREDNISOLONE ACETATE 80 MG/ML
160 INJECTION, SUSPENSION INTRA-ARTICULAR; INTRALESIONAL; INTRAMUSCULAR; SOFT TISSUE
Status: COMPLETED | OUTPATIENT
Start: 2021-03-11 | End: 2021-03-11

## 2021-05-28 VITALS
TEMPERATURE: 98.1 F | BODY MASS INDEX: 27.47 KG/M2 | HEART RATE: 91 BPM | OXYGEN SATURATION: 98 % | WEIGHT: 155.06 LBS | TEMPERATURE: 98.2 F | RESPIRATION RATE: 12 BRPM | OXYGEN SATURATION: 99 % | SYSTOLIC BLOOD PRESSURE: 123 MMHG | HEIGHT: 63 IN | HEIGHT: 64 IN | WEIGHT: 156.25 LBS | RESPIRATION RATE: 16 BRPM | DIASTOLIC BLOOD PRESSURE: 68 MMHG | DIASTOLIC BLOOD PRESSURE: 70 MMHG | HEART RATE: 91 BPM | SYSTOLIC BLOOD PRESSURE: 114 MMHG | BODY MASS INDEX: 26.67 KG/M2

## 2021-05-28 NOTE — PROGRESS NOTES
Patient: MARTHA ROMO     Acct: [id number]     Report: #[id number]  UNIT #: [id number]     : 1955    Encounter Date:2019  PRIMARY CARE: DEVAN AHUJA  ***Signed***  --------------------------------------------------------------------------------------------------------------------  Chief Complaint      Encounter Date      2019            Primary Care Provider            Devan Ahuja            Referring Provider            Devan Ahuja            Patient Complaint      Patient is complaining of      New pt here for Bronchitis, wheezing            VITALS      Height 5 ft 4 in / 162.56 cm      Weight 156 lbs 4 oz / 70.446026 kg      BSA 1.76 m2      BMI 26.8 kg/m2      Temperature 98.2 F / 36.78 C - Oral      Pulse 91      Respirations 12      Blood Pressure 123/68 Sitting, Left Arm      Pulse Oximetry 98%, room air      Initial Exhaled Nitrous Oxide      Date:  2019      Exhaled Nitrous Oxide Results:  15            HPI      The patient is a 63 year old female lifetime nonsmoker who presents for     evaluation of chronic cough. She has been diagnosed with Chronic Obstructive     Pulmonary Disease from second hand smoke exposure and asthma. She has     significant allergies and she takes Singular. She states her cough is worse in     the winter months. She normally needs 2 rounds of antibiotics and steroids to     make it through the winter. Her cough improves during the summer months. She is     on Symbicort inhaler and uses it daily. 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 She lives with her  and     they have a gas fireplace and electric heat and air. They have chickens on their    farm but she does not go around them or help take care of them. She has been     hospitalized once for her breathing and was diagnosed with  bronchitis. Today she    complains of intermittent cough that is not bothersome to her. She gets short of    breath if she tried to over do it or exert herself.             Review of Systems as noted.            Past medical, family, social and surgical history reviewed and I agree with that    as documented in the medical chart.            Medications were reviewed and updated in the chart.              ROS      Constitutional:  Denies: Fatigue, Fever, Weight gain, Weight loss, Chills,     Insomnia, Other      Respiratory/Breathing:  Complains of: Shortness of air, Cough; Denies: Wheezing,    Hemoptysis, Pleuritic pain, Other      Endocrine:  Denies: Polydipsia, Polyuria, Heat/cold intolerance, Abnorml     menstrual pattern, Diabetes, Other      Eyes:  Denies: Blurred vision, Vision Changes, Other      Ears, nose, mouth, throat:  Denies: Mouth lesions, Thrush, Throat pain,     Hoarseness, Allergies/Hay Fever, Post Nasal Drip, Headaches, Recent Head Injury,    Nose Bleeding, Neck Stiffness, Thyroid Mass, Hearing Loss, Ear Fullness, Dry     Mouth, Nasal or Sinus Pain, Dry Lips, Nasal discharge, Nasal congestion, Other      Cardiovascular:  Denies: Palpitations, Syncope, Claudication, Chest Pain, Wake     up Gasping for air, Leg Swelling, Irregular Heart Rate, Cyanosis, Dyspnea on     Exertion, Other      Gastrointestinal:  Denies: Nausea, Constipation, Diarrhea, Abdominal pain,     Vomiting, Difficulty Swallowing, Reflux/Heartburn, Dysphagia, Jaundice,     Bloating, Melena, Bloody stools, Other      Genitourinary:  Denies: Urinary frequency, Incontinence, Hematuria, Urgency,     Nocturia, Dysuria, Testicular problems, Other      Musculoskeletal:  Denies: Joint Pain, Joint Stiffness, Joint Swelling, Myalgias,    Other      Hematologic/lymphatic:  DENIES: Lymphadenopathy, Bruising, Bleeding tendencies,     Other      Neurological:  Denies: Headache, Numbness, Weakness, Seizures, Other      Psychiatric:  Denies:  Anxiety, Appropriate Effect, Depression, Other      Sleep:  No: Excessive daytime sleep, Morning Headache?, Snoring, Insomnia?, Stop    breathing at sleep?, Other      Integumentary:  Denies: Rash, Dry skin, Skin Warm to Touch, Other      Immunologic/Allergic:  Denies: Latex allergy, Seasonal allergies, Asthma, Urti    caria, Eczema, Other      Immunization status:  No: Up to date            FAMILY/SOCIAL/MEDICAL HX      Surgical History:  Yes: Appendectomy, Cholecystectomy, Oral Surgery (vocal     chords stripped), Orthopedic Surgery (brandi feet, right knee), Other Surgeries     (spider bite cut out)      Cancer/Type - Family Hx:  Father (prostate), Aunt (Lung)      Is Father Still Living?:  No      Is Mother Still Living?:  No      Social History:  No Tobacco Use, No Alcohol Use, No Recreational Drug use      Smoking status:  Never smoker      Tubal Ligation:  Yes      Hysterectomy:  Yes      Anticoagulation Therapy:  No      Antibiotic Prophylaxis:  No      Medical History:  Yes: Allergies, Asthma, Chronic Bronchitis/COPD, Diabetes,     High Blood Pressure, Reflux Disease, Shortness Of Breath, Sinus Trouble      Psychiatric History      None            PREVENTION      Hx Influenza Vaccination:  Yes      Date Influenza Vaccine Given:  Oct 1, 2018      Influenza Vaccine Declined:  No      2 or More Falls Past Year?:  No      Fall Past Year with Injury?:  No      Hx Pneumococcal Vaccination:  Yes      Encouraged to follow-up with:  PCP regarding preventative exams.      Chart initiated by      Ivory White MA            ALLERGIES/MEDICATIONS      Allergies:        Coded Allergies:             ACETAMINOPHEN (Verified  Allergy, Intermediate, 4/22/19)                  mental staus           CHLORPHENIRAMINE (Verified  Allergy, Intermediate, 4/22/19)                  rash           CODEINE (Verified  Allergy, Intermediate, 4/22/19)                  rash           DIAZEPAM (Verified  Allergy, Intermediate, 4/22/19)                   mental status           HYDROCODONE (Verified  Allergy, Intermediate, 4/22/19)                  mental staus           Iodinated Contrast- Oral And Iv Dye (Verified  Allergy, Intermediate,     4/22/19)                  rash           KETOROLAC (Verified  Allergy, Intermediate, 4/22/19)                  hallucinate           LEVOFLOXACIN (Verified  Allergy, Intermediate, 4/22/19)                  lips swell           METHOCARBAMOL (Verified  Allergy, Intermediate, 4/22/19)                  breathing issues           MORPHINE (Verified  Allergy, Intermediate, 4/22/19)                  Hives           PENICILLINS (Verified  Allergy, Intermediate, 4/22/19)                  rash           PENTAZOCINE (Verified  Allergy, Intermediate, 4/22/19)                  rash           PROPAFENONE (Verified  Allergy, Intermediate, 4/22/19)                  rash      Medications    Last Reconciled on 4/22/19 14:25 by ELIZABETH SHEA,       Dulaglutide (Trulicity) Unknown Strength Pen.injctr      SUBQ Q7DAY, #1 PEN.INJCTR         Reported         4/22/19       Lactobac Cmb #3/Fos/Pantethine (Probiotic   1 EACH PO, CAP         Reported         4/22/19       (chlortabs) 4 MG No Conflict Check               Reported         4/22/19       Empagliflozin (Jardiance) 25 Mg Tablet      25 MG PO QDAY for 30 Days, #30 TAB         Reported         4/22/19       Ferrous Sulfate (Ferrous Sulfate*) 325 Mg Tablet      325 MG PO QDAY, #30 TAB 0 Refills         Reported         4/22/19       Ranitidine HCl (Ranitidine*) 300 Mg Tablet      300 MG PO QDAY, #30 TAB         Reported         4/22/19       Esomeprazole Mag (NexIUM) 40 Mg Capsule      40 MG PO QDAY@07, #30 CAP 0 Refills         Reported         4/22/19       Montelukast Sodium (Montelukast*) 10 Mg Tablet      10 MG PO QDAY, TAB         Reported         4/22/19       Magnesium Oxide (Magnesium Oxide*) 400 Mg Tablet      400 MG PO BID, #60 TAB 0 Refills         Reported          4/22/19       Cholecalciferol (Vitamin D3) 50,000 Unit Capsule      52842 UNITS PO Q7D, CAP         Reported         4/22/19       Metformin Hcl* (Metformin Hcl*) 850 Mg Tablet      1000 MG PO BID, #60 TAB 0 Refills         Reported         4/22/19       Ondansetron (ZUPLENZ) 4 Mg Film      4 MG PO Q4H PRN for NAUSEA, FILM 0 Refills         Reported         4/22/19      Current Medications      Current Medications Reviewed 4/22/19            EXAM      Vital signs reviewed.      GENERAL:  The patient appears older than stated age, no dyspnea at rest or with     conversation.       HEENT: Pupils are equally round and reactive to light and accommodation.      Extraocular muscles intact bilateral. Nares patent without hypertrophy of the     turbinates.  TM's are what appears to be bubbles behind the right tympanic     membrane, both were translucent, poor cone of light and it appeared retracted.     Small crowed posterior pharynx without lesions or erythema, Mallampati III, no     tonsillar hypertrophy.         NECK:  Supple without tracheal deviation or lymphadenopathy. No thyromegaly     appreciated.       LYMPHATICS: No cervical or supraclavicular lymphadenopathy.       RESPIRATORY: Coarse rhonchi bilaterally, expiratory wheezes, poor expiratory and     inspiratory effect,  no  rales, tympanic to percussion.      CVS: No murmurs, rubs or gallops, no lower extremity edema, nondisplaced PMI,     equal radial pulses.      GI: Abdomen protuberant, obese,  soft, nontender, nondistended, no hepatomegaly     appreciated.  Bowel sounds present in all 4 quadrants.       MUSCULOSKELETAL: No erythema, warmth or fluctuance over the major joints     including the knees, ankles, wrists and elbows.        SKIN: No rashes or lesions. There is no clubbing of the nails.       NEUROLOGICAL: Alert and oriented X 3.  No focal deficits on exam. Cranial nerves     II-XII intact bilaterally.       PSYCH: Patient has appropriate mood and  affect.      Vitals      Vitals:             Height 5 ft 4 in / 162.56 cm           Weight 156 lbs 4 oz / 70.098891 kg           BSA 1.76 m2           BMI 26.8 kg/m2           Temperature 98.2 F / 36.78 C - Oral           Pulse 91           Respirations 12           Blood Pressure 123/68 Sitting, Left Arm           Pulse Oximetry 98%, room air            REVIEW      Results Reviewed      PCCS Results Reviewed?:  Yes Prev Radiology Results, Yes Previous [facility] Records      Lab Results      I reviewed documentation from [facility].      Radiographic Results      I personally reviewed documentation from [facility] including a CT of the     chest performed in 2017. The patient has multiple pulmonary lung nodules dating     back to 2014 and appear stable per the report. The day of the study was     03/14/17.            Assessment      Multiple lung nodules on CT - R91.8            Chronic cough - R05            Notes      New Medications      * ONDANSETRON (ZUPLENZ) 4 MG FILM: 4 MG PO Q4H PRN NAUSEA      * Metformin Hcl* 850 MG TABLET: 1,000 MG PO BID #60      * Cholecalciferol (Vitamin D3) 50,000 UNIT CAPSULE: 50,000 UNITS PO Q7D      * MAGNESIUM OXIDE (Magnesium Oxide*) 400 MG TABLET: 400 MG PO BID #60      * MONTELUKAST SODIUM (Montelukast*) 10 MG TABLET: 10 MG PO QDAY      * Esomeprazole Mag (NexIUM) 40 MG CAPSULE: 40 MG PO QDAY@07 #30         Instructions: Take on an empty stomach, one hour before or two hours after        meal.      * Ranitidine HCl (Ranitidine*) 300 MG TABLET: 300 MG PO QDAY #30      * FERROUS SULFATE (Ferrous Sulfate*) 325 MG TABLET: 325 MG PO QDAY #30      * EMPAGLIFLOZIN (Jardiance) 25 MG TABLET: 25 MG PO QDAY 30 Days #30      * (chlortabs) 4 MG:       * LACTOBAC CMB #3/FOS/PANTETHINE (Probiotic      EACH PO      * DULAGLUTIDE (Trulicity) Unknown Strength PEN.INJCTR: SUBQ Q7DAY #1      * Budesonide/Formoterol Fumarate (Symbicort 160/4.5 Mcg) 10.2 GM INH: 2 PUFF INH       BID  #1      New Diagnostics      * 6 Min Walk With Pulse Ox, Routine         Dx: Multiple lung nodules on CT - R91.8      * PFT-Comp, PrePost,DLCO,BodyBox, Routine         Dx: Multiple lung nodules on CT - R91.8      * Chest W/O Cont CT, Routine         Dx: Multiple lung nodules on CT - R91.8      * T SPOT TB TEST, Routine         Dx: Multiple lung nodules on CT - R91.8      ASSESSMENT:      1. Multiple pulmonary lung nodules.       2. Chronic cough.       3. History of Chronic Obstructive Pulmonary Disease from second hand smoke     exposure.       4. Dyspnea with exertion.       5. GERD.       6. Diabetes.       7. Hypertension.             PLAN:      1. I have ordered a repeat CT of the chest without contrast to follow up on     these multiple lung nodules and a T spot test. I will also order full PFTs and 6     minute walk talk test.       2. I discussed my differential with the patient today including sequela of     histoplasmosis versus sarcoidosis versus cancer. She verbalized understanding.       3. Continue Symbicort for now.       4. We will see her back after the above work up.            Patient Education      Patient Education Provided:  COPD, Lung Cancer                 Disclaimer: Converted document may not contain table formatting or lab diagrams. Please see [employer] [facility] for the authenticated document.

## 2021-05-28 NOTE — PROGRESS NOTES
Patient: MARTHA ROMO     Acct: [id number]     Report: #[id number]  UNIT #: [id number]     : 1955    Encounter Date:2019  PRIMARY CARE: DEVAN AHUJA  ***Signed***  --------------------------------------------------------------------------------------------------------------------  Chief Complaint      Encounter Date      2019            Primary Care Provider            Devan Ahuja            Referring Provider            Devan Ahuja            Patient Complaint      Patient is complaining of      test follow up/soa            VITALS      Height 5 ft 3 in / 160.02 cm      Weight 155 lbs 1 oz / 70.951169 kg      BSA 1.74 m2      BMI 27.5 kg/m2      Temperature 98.1 F / 36.72 C - Oral      Pulse 91      Respirations 16      Blood Pressure 114/70 Sitting, Right Arm      Pulse Oximetry 99%, room air      Initial Exhaled Nitrous Oxide      Date:  2019      Exhaled Nitrous Oxide Results:  15            HPI      The patient is a 64 year old female who presents for follow up regarding     multiple pulmonary lung nodules and chronic cough. She was last seen by myself     in April at which time I ordered pulmonary function test, 6 minute walk test, CT    scan of the chest and tuberculosis spot test. The lab could not perform the     tuberculosis spot test so it was changed to a quantiferon gold test. She returns    today for follow up on all these tests.              In the interim she continues to have chronic cough, wheezing and shortness of     breath. Her cough occurs throughout the day, it does not awaken her at night.     She is on Symbicort 160/4.5 2 puffs twice daily. She is on Singulair and has an     albuterol inhaler but does not use it. She has had no recent antibiotics or     steroids and no hospitalizations.             Review of Systems as noted.            Past medical, family, social and surgical history reviewed and I agree with that    as documented in  the medical chart.            Medications were reviewed and updated in the chart.            ROS      Constitutional:  Denies: Fatigue, Fever, Weight gain, Weight loss, Chills,     Insomnia, Other      Respiratory/Breathing:  Complains of: Shortness of air, Wheezing, Cough; Denies:    Hemoptysis, Pleuritic pain, Other      Endocrine:  Denies: Polydipsia, Polyuria, Heat/cold intolerance, Abnorml     menstrual pattern, Diabetes, Other      Eyes:  Denies: Blurred vision, Vision Changes, Other      Ears, nose, mouth, throat:  Denies: Mouth lesions, Thrush, Throat pain, H    oarseness, Allergies/Hay Fever, Post Nasal Drip, Headaches, Recent Head Injury,     Nose Bleeding, Neck Stiffness, Thyroid Mass, Hearing Loss, Ear Fullness, Dry     Mouth, Nasal or Sinus Pain, Dry Lips, Nasal discharge, Nasal congestion, Other      Cardiovascular:  Denies: Palpitations, Syncope, Claudication, Chest Pain, Wake     up Gasping for air, Leg Swelling, Irregular Heart Rate, Cyanosis, Dyspnea on     Exertion, Other      Gastrointestinal:  Denies: Nausea, Constipation, Diarrhea, Abdominal pain,     Vomiting, Difficulty Swallowing, Reflux/Heartburn, Dysphagia, Jaundice,     Bloating, Melena, Bloody stools, Other      Genitourinary:  Denies: Urinary frequency, Incontinence, Hematuria, Urgency,     Nocturia, Dysuria, Testicular problems, Other      Musculoskeletal:  Denies: Joint Pain, Joint Stiffness, Joint Swelling, Myalgias,    Other      Hematologic/lymphatic:  DENIES: Lymphadenopathy, Bruising, Bleeding tendencies,     Other      Neurological:  Denies: Headache, Numbness, Weakness, Seizures, Other      Psychiatric:  Denies: Anxiety, Appropriate Effect, Depression, Other      Sleep:  No: Excessive daytime sleep, Morning Headache?, Snoring, Insomnia?, Stop    breathing at sleep?, Other      Integumentary:  Denies: Rash, Dry skin, Skin Warm to Touch, Other      Immunologic/Allergic:  Denies: Latex allergy, Seasonal allergies, Asthma,      Urticaria, Eczema, Other      Immunization status:  No: Up to date            FAMILY/SOCIAL/MEDICAL HX      Surgical History:  Yes: Appendectomy, Cholecystectomy, Oral Surgery (vocal     chords stripped), Orthopedic Surgery (brandi feet, right knee), Other Surgeries     (spider bite cut out); No: AAA Repair, Abdominal Surgery, Adenoids, Angioplasty,    Back Surgery, Bladder Surgery, Bowel Surgery, Breast Surgery, CABG, Carotid     Stenosis, Ear Surgery, Eye Surgery, Head Surgery, Hernia Surgery, Kidney     Surgery, Nose Surgery, Prostatectomy, Rectal Surgery, Spinal Surgery, Testicular    Surgery, Throat Surgery, Tonsils, Valve Replacement, Vascular Surgery      Cancer/Type - Family Hx:  Father (prostate), Aunt (Lung)      Is Father Still Living?:  No      Is Mother Still Living?:  No       Family History:  Yes      Social History:  No Tobacco Use, No Alcohol Use, No Recreational Drug use      Smoking status:  Never smoker      Tubal Ligation:  Yes      Hysterectomy:  Yes      Anticoagulation Therapy:  No      Antibiotic Prophylaxis:  No      Medical History:  Yes: Allergies, Asthma, Chronic Bronchitis/COPD, Diabetes,     High Blood Pressure, Reflux Disease, Shortness Of Breath, Sinus Trouble; No:     Alcoholism, Anemia, Arthritis, Atrial Fibrillation, Blood Disease, Broken Bones,    Cataracts, Chemical Dependency, Chemotherapy/Cancer, Emphysema, Chronic Liver     Disease, Colon Trouble, Colitis, Diverticulitis, Congestive Heart Failu,     Deafness or Ringing Ears, Convulsions, Depression, Anxiety, Bipolar Disorder,     PTSD, Epilepsy, Seizures, Forgetfullness, Glaucoma, Gall Stones, Gout, Head     Injury, Heart Attack, Heart Murmur, GERD, Hemorrhoids/Rectal Prob, Hepatitis,     Hiatal Hernia, High Cholesterol, HIV (Do not ask - volu, Jaundice, Kidney or     Bladder Disease, Kidney Stones, Migrane Headaches, Mitral Valve Prolapse, Night     sweats, Phlebitis, Psychiatric Care, Rheumatic Fever, Sexually Transmitted  Dis,     Skin Disease/Psoriais/Ecz, Stroke, Thyroid Problem, Tuberculosis or Pos TB Te,     Miscellaneous Medical/oth      Psychiatric History      none            PREVENTION      Hx Influenza Vaccination:  Yes      Date Influenza Vaccine Given:  Oct 1, 2018      Influenza Vaccine Declined:  No      2 or More Falls Past Year?:  No      Fall Past Year with Injury?:  No      Hx Pneumococcal Vaccination:  Yes      Encouraged to follow-up with:  PCP regarding preventative exams.      Chart initiated by      mayi reynoso ma            ALLERGIES/MEDICATIONS      Allergies:        Coded Allergies:             ACETAMINOPHEN (Verified  Allergy, Intermediate, 6/14/19)                  mental staus           CHLORPHENIRAMINE (Verified  Allergy, Intermediate, 6/14/19)                  rash           CODEINE (Verified  Allergy, Intermediate, 6/14/19)                  rash           DIAZEPAM (Verified  Allergy, Intermediate, 6/14/19)                  mental status           HYDROCODONE (Verified  Allergy, Intermediate, 6/14/19)                  mental staus           IODINATED CONTRAST- ORAL AND IV DYE (Verified  Allergy, Intermediate,     6/14/19)                  rash           KETOROLAC (Verified  Allergy, Intermediate, 6/14/19)                  hallucinate           LEVOFLOXACIN (Verified  Allergy, Intermediate, 6/14/19)                  lips swell           METHOCARBAMOL (Verified  Allergy, Intermediate, 6/14/19)                  breathing issues           MORPHINE (Verified  Allergy, Intermediate, 6/14/19)                  Hives           PENICILLINS (Verified  Allergy, Intermediate, 6/14/19)                  rash           PENTAZOCINE (Verified  Allergy, Intermediate, 6/14/19)                  rash           PROPAFENONE (Verified  Allergy, Intermediate, 6/14/19)                  rash      Medications    Last Reconciled on 6/20/19 12:40 by ELIZABETH SHEA,       Azithromycin Z-Chu (Zithromax Z-Chu) 250 Mg Tablet      250 MG  PO ASDIR, #1 PACKET         Prov: ELIZABETH SHEA         6/14/19       predniSONE* (Deltasone*) 10 Mg Tablet      10 MG PO ASDIR, #45 TAB 0 Refills         Prov: ELIZABETH SHEA         6/14/19       Tiotropium Bromide (Spiriva Respimat 2.5 mcg/Puff) 4 Gm Mist.inhal      2 PUFFS INH RTQDAY, #1 MDI 4 Refills         Prov: ELIZABETH SHEA         6/14/19       Budesonide/Formoterol Fumarate (Symbicort 160/4.5 Mcg) 10.2 Gm Inh      2 PUFF INH BID, #1 INH 3 Refills         Prov: ELIZABETH SHEA         4/22/19       Dulaglutide (Trulicity) Unknown Strength Pen.injctr      SUBQ Q7DAY, #1 PEN.INJCTR         Reported         4/22/19       Lactobac Cmb #3/Fos/Pantethine (Probiotic   1 EACH PO, CAP         Reported         4/22/19       (chlortabs) 4 MG No Conflict Check               Reported         4/22/19       Empagliflozin (Jardiance) 25 Mg Tablet      25 MG PO QDAY for 30 Days, #30 TAB         Reported         4/22/19       Ferrous Sulfate (Ferrous Sulfate*) 325 Mg Tablet      325 MG PO QDAY, #30 TAB 0 Refills         Reported         4/22/19       Ranitidine HCl (Ranitidine*) 300 Mg Tablet      300 MG PO QDAY, #30 TAB         Reported         4/22/19       Esomeprazole Mag (NexIUM) 40 Mg Capsule      40 MG PO QDAY@07, #30 CAP 0 Refills         Reported         4/22/19       Montelukast Sodium (Montelukast*) 10 Mg Tablet      10 MG PO QDAY, TAB         Reported         4/22/19       Magnesium Oxide (Magnesium Oxide*) 400 Mg Tablet      400 MG PO BID, #60 TAB 0 Refills         Reported         4/22/19       Cholecalciferol (Vitamin D3) 50,000 Unit Capsule      04796 UNITS PO Q7D, CAP         Reported         4/22/19       Metformin Hcl* (Metformin Hcl*) 850 Mg Tablet      1000 MG PO BID, #60 TAB 0 Refills         Reported         4/22/19       Ondansetron (ZUPLENZ) 4 Mg Film      4 MG PO Q4H PRN for NAUSEA, FILM 0 Refills         Reported         4/22/19      Current Medications      Current Medications Reviewed 6/14/19             EXAM      VITAL SIGNS:  Reviewed.        NECK:  Supple without tracheal deviation or lymphadenopathy.  No thyromegaly     appreciated.      LYMPHATICS:  No cervical or supraclavicular lymphadenopathy.      HEENT: Pupils are equal, round and reactive to light. There is no scleral     icterus.  Nares patent without hypertrophy of the turbinates. No erythema of the     passages.  TMs are clear bilaterally with good cone of light. The posterior p    harynx is without  lesions or erythema.      RESPIRATORY: Very coarse rhonchi bilaterally, she is coughing throughout the     exam, no work of breathing noted.  No wheezes or rales.  Tympanic to percussion.            CARDIOVASCULAR:  Regular rate and rhythm.  No murmurs, gallops or rubs.  No     lower extremity edema.  Equal radial pulses.        GI: Soft, nontender, nondistended, no organomegaly.  Bowel sounds present in all     four quadrants.      MUSCULOSKELETAL:  No joint effusions, erythema or warmth over the major joint     systems.      SKIN:  No rashes or lesions.      NEUROLOGIC: Cranial nerves II-XII are intact bilaterally.  Moves all     extremities. Ambulates with ease.      PSYCH:  Appropriate mood and affect.      Vtials      Vitals:             Height 5 ft 3 in / 160.02 cm           Weight 155 lbs 1 oz / 70.482015 kg           BSA 1.74 m2           BMI 27.5 kg/m2           Temperature 98.1 F / 36.72 C - Oral           Pulse 91           Respirations 16           Blood Pressure 114/70 Sitting, Right Arm           Pulse Oximetry 99%, room air            REVIEW      Results Reviewed      PCCS Results Reviewed?:  Yes Prev Radiology Results      Lab Results      I personally reviewed 6 minute walk test, the patient did not require sup    plemental oxygen. I also reviewed quantiferon gold test which was negative.      Radiographic Results               [facility]                PACS RADIOLOGY REPORT             Patient: MARTHA ROMO   Acct: #[id number]   Report: #[id number]            UNIT #: [id number]    DOS: 19 1253      INSURANCE:[employer] - OTHER   ORDER #:[id number]      LOCATION:[facility]     : 1955            PROVIDERS      ADMITTING:     ATTENDING: ELIZABETH SHEA      FAMILY:  MAGAN AGUILAR   ORDERING:  ELIZABETH SHEA         OTHER:    DICTATING:  Robb Melendez MD            REQ #:[id number]   EXAM:CH[facility] - CT CHEST without CONTRAST      REASON FOR EXAM:  COUGH      REASON FOR VISIT:  COUGH            *******Signed******         PROCEDURE:   CT CHEST WITHOUT CONTRAST             COMPARISON:   Other, CT, CHEST W/O CONTRAST, 2018, 15:16.             INDICATIONS:   COUGH             TECHNIQUE:   CT images were created without the administration of contrast     material.               PROTOCOL:     Standard imaging protocol performed                RADIATION:     DLP: 471mGy*cm          Automated exposure control was utilized to minimize radiation dose.              FINDINGS:         CT of the chest without contrast is compared previous study performed on     2018.  Today's study       reveals no evidence of mass or adenopathy in the base the neck or in the     periclavicular soft       tissues or in the axillary soft tissues.  No evidence of mass or adenopathy in     the mediastinum or       in the right or left pulmonary carlos.  The heart size normal.  No evidence of     pericardial effusion.        No evidence of hiatal hernia.  No acute disease in the superior aspect the     abdomen.  There are       surgical clips in the gallbladder fossa consistent with cholecystectomy.  No     evidence of skeletal       metastatic disease.  No visualize pneumonia or pleural effusion or pneumothorax     or mass or or       metastatic disease in the right or left lungs.  There are small tiny nodular     densities scattered       throughout the lungs bilaterally unchanged since previous study  performed on     4/18/2018 that I       believe are benign granulomas.  There is a 1.4 cm in size homogeneous well-    circumscribed nodular       density in the right lower lobe lung unchanged since previous study that I     believe is benign.  No       evidence of pneumonia or pleural effusion or pneumothorax or mass in the right     or left lungs.  Mild       diverticulosis is seen in the splenic flexure of the colon.             CONCLUSION:         1. No acute disease in the chest and no significant change since previous study     performed on       4/18/2018.      2. No evidence of pneumonia or pleural effusion or pneumothorax or mass or me    tastatic disease in       right or left lungs.              ROLAND MELGAR MD             Electronically Signed and Approved By: ROLAND MELGAR MD on 5/23/2019 at     13:51                        Until signed, this is an unconfirmed preliminary report that may contain      errors and is subject to change.                                              GOLKE:      D:05/23/19 1351      PFT Results      I personally reviewed pulmonary function test showing moderate obstructive     defect.            Assessment      COPD exacerbation - J44.1            Notes      New Medications      * TIOTROPIUM BROMIDE (Spiriva Respimat 2.5 mcg/Puff) 4 GM MIST.INHAL: 2 PUFFS       INH RTQDAY #1      * predniSONE* (Deltasone*) 10 MG TABLET: 10 MG PO ASDIR #45         Instructions: [id number],82szn5e,98hrq9r,44sqf2t,81nrl4g      * Azithromycin Z-Chu (Zithromax Z-Chu) 250 MG TABLET: 250 MG PO ASDIR #1         Instructions: Take 500 mg (two tablets) by mouth the first day, then 250 mg        (one tablet) daily until all taken.      New Office Procedures      * Inhaler Education, Routine         Dx: COPD exacerbation - J44.1      ASSESSMENT:      1. 1.4 cm well circumscribed nodule in the right lower lobe stable for the last     1 year.       2.  Moderate chronic obstructive pulmonary  disease with acute exacerbation.       3. Dyspnea with exertion.       4. Asthma chronic obstructive pulmonary disease overlap syndrome.              PLAN:      1. The patient's inhaler regimen will be optimized today. We will add Spiriva     Respimat 2.5 mcg 2 puffs inhaled once daily. I did call in a prednisone taper as     well as a Z-pack for her acute exacerbation.       2. We taught her how to appropriately use the spiriva and ensured she was using     Symbicort correctly.       3. I told the patient that her nodule appears stable over the past 1 year and I     recommend that she have another dedicated CT scan of the chest in 1 year to     ensure no growth or change.       4. Continue Symbicort.       5. Follow up in short interval to ensure her exacerbation has completely     resolved.            Patient Education      Education resources provided:  Yes      Patient Education Provided:  Acute Bronchitis, COPD                 Disclaimer: Converted document may not contain table formatting or lab diagrams. Please see [employer] for the authenticated document.

## 2021-06-10 ENCOUNTER — CLINICAL SUPPORT (OUTPATIENT)
Dept: ORTHOPEDIC SURGERY | Facility: CLINIC | Age: 66
End: 2021-06-10

## 2021-06-10 VITALS — BODY MASS INDEX: 27.46 KG/M2 | TEMPERATURE: 98.4 F | WEIGHT: 155 LBS | HEIGHT: 63 IN

## 2021-06-10 DIAGNOSIS — M70.62 GREATER TROCHANTERIC BURSITIS OF BOTH HIPS: Primary | ICD-10-CM

## 2021-06-10 DIAGNOSIS — M70.61 GREATER TROCHANTERIC BURSITIS OF BOTH HIPS: Primary | ICD-10-CM

## 2021-06-10 PROCEDURE — 20610 DRAIN/INJ JOINT/BURSA W/O US: CPT | Performed by: ORTHOPAEDIC SURGERY

## 2021-06-10 RX ORDER — ORAL SEMAGLUTIDE 7 MG/1
TABLET ORAL
COMMUNITY

## 2021-06-10 RX ORDER — METHENAMINE HIPPURATE 1000 MG/1
TABLET ORAL
COMMUNITY
Start: 2021-06-08

## 2021-06-10 RX ORDER — OXYBUTYNIN CHLORIDE 10 MG/1
TABLET, EXTENDED RELEASE ORAL
COMMUNITY
Start: 2021-06-09

## 2021-06-10 RX ORDER — ORAL SEMAGLUTIDE 3 MG/1
TABLET ORAL
COMMUNITY

## 2021-06-10 RX ORDER — OXYBUTYNIN CHLORIDE 5 MG/1
5 TABLET, EXTENDED RELEASE ORAL DAILY
COMMUNITY
Start: 2021-04-29

## 2021-06-10 RX ADMIN — METHYLPREDNISOLONE ACETATE 160 MG: 80 INJECTION, SUSPENSION INTRA-ARTICULAR; INTRALESIONAL; INTRAMUSCULAR; SOFT TISSUE at 13:18

## 2021-06-10 NOTE — PROGRESS NOTES
"Chief Complaint  Follow-up and Pain of the Left Hip    Subjective    History of Present Illness      Pearl Temple is a 65 y.o. female who presents to [facility] for Patient returns for follow-up on hip pain. The pain is over the lateral aspect of the left hip at the greater trochanteric bursa.  Her pain has been progressive in nature and remains intermittent .   Her pain is worsened  going up and down stairs, rising after sitting. There has been improvement in the past with injections.      Objective   Vital Signs:   Temp 98.4 °F (36.9 °C)   Ht 160 cm (63\")   Wt 70.3 kg (155 lb)   BMI 27.46 kg/m²     Physical Exam  65 y.o. female is awake, alert, oriented, in no acute distress and well developed, well nourished.  Ortho Exam   LEFT hip  Left hip (gtb): Skin and soft tissues over the greater trochanteric bursa are tender upon palpation, along with IT band tenderness. Cross body adduction is negative. Internal and external rotations are bothersome and cause tenderness over the greater trochanter. There is no clinical deformity and no shortening. She does not have a limp upon walking. There is not piriformis tightness and there  is not capsular tightness with any rotation. Dorsalis pedis and posterior tibial artery pulses are palpable. Neurovascular status is intact and capillary refill is less than 2 seconds. Common peroneal nerve function is well preserved.      Result Review :                  Large Joint Arthrocentesis: L greater trochanteric bursa  Date/Time: 6/10/2021 1:18 PM  Consent given by: patient  Site marked: site marked  Timeout: Immediately prior to procedure a time out was called to verify the correct patient, procedure, equipment, support staff and site/side marked as required   Supporting Documentation  Indications: pain   Procedure Details  Location: hip - L greater trochanteric bursa  Preparation: Patient was prepped and draped in the usual sterile fashion  Needle " size: 22 G  Medications administered: 2 mL lidocaine (cardiac); 160 mg methylPREDNISolone acetate 80 MG/ML  Patient tolerance: patient tolerated the procedure well with no immediate complications               Assessment   Assessment and Plan    Problem List Items Addressed This Visit        Musculoskeletal and Injuries    Greater trochanteric bursitis of both hips - Primary          Follow Up   · Injected patient's left hip-greater trochanteric bursa joint(s)with Depo-Medrol from an anterolateral approach    · Rest, ice, compression, and elevation (RICE) therapy  · OTC Alternate Ibuprofen and Tylenol as needed  · Follow up in 3 month(s)  • Patient was given instructions and counseling regarding her condition or for health maintenance advice. Please see specific information pulled into the AVS if appropriate.     Honorio Hayes MD   Date of Encounter: 6/10/2021       EMR Dragon/Transcription disclaimer:  Much of this encounter note is an electronic transcription/translation of spoken language to printed text. The electronic translation of spoken language may permit erroneous, or at times, nonsensical words or phrases to be inadvertently transcribed; Although I have reviewed the note for such errors, some may still exist.

## 2021-06-11 RX ORDER — METHYLPREDNISOLONE ACETATE 80 MG/ML
160 INJECTION, SUSPENSION INTRA-ARTICULAR; INTRALESIONAL; INTRAMUSCULAR; SOFT TISSUE
Status: COMPLETED | OUTPATIENT
Start: 2021-06-10 | End: 2021-06-10

## 2021-09-13 ENCOUNTER — CLINICAL SUPPORT (OUTPATIENT)
Dept: ORTHOPEDIC SURGERY | Facility: CLINIC | Age: 66
End: 2021-09-13

## 2021-09-13 VITALS — TEMPERATURE: 97.5 F | WEIGHT: 155 LBS | BODY MASS INDEX: 27.46 KG/M2 | HEIGHT: 63 IN

## 2021-09-13 DIAGNOSIS — M25.552 LEFT HIP PAIN: Primary | ICD-10-CM

## 2021-09-13 PROCEDURE — 20610 DRAIN/INJ JOINT/BURSA W/O US: CPT | Performed by: ORTHOPAEDIC SURGERY

## 2021-09-13 RX ORDER — METHYLPREDNISOLONE ACETATE 80 MG/ML
160 INJECTION, SUSPENSION INTRA-ARTICULAR; INTRALESIONAL; INTRAMUSCULAR; SOFT TISSUE
Status: COMPLETED | OUTPATIENT
Start: 2021-09-13 | End: 2021-09-13

## 2021-09-13 RX ADMIN — METHYLPREDNISOLONE ACETATE 160 MG: 80 INJECTION, SUSPENSION INTRA-ARTICULAR; INTRALESIONAL; INTRAMUSCULAR; SOFT TISSUE at 15:20

## 2021-09-13 NOTE — PROGRESS NOTES
"Chief Complaint  Follow-up and Pain of the Left Hip    Subjective    History of Present Illness      Pearl Temple is a 66 y.o. female who presents to [facility] for Patient returns for follow-up on hip pain. The pain is over the lateral aspect of the left hip at the greater trochanteric bursa.  Her pain has been progressive in nature and remains intermittent .   Her pain is worsened  going up and down stairs, kneeling, walking, squatting. There has been improvement in the past with injections.      Objective   Vital Signs:   Temp 97.5 °F (36.4 °C)   Ht 160 cm (63\")   Wt 70.3 kg (155 lb)   BMI 27.46 kg/m²     Physical Exam  66 y.o. female is awake, alert, oriented, in no acute distress and well developed, well nourished.  Ortho Exam   LEFT hip  Left hip (gtb): Skin and soft tissues over the greater trochanteric bursa are tender upon palpation, along with IT band tenderness. Cross body adduction is negative. Internal and external rotations are bothersome and cause tenderness over the greater trochanter. There is no clinical deformity and no shortening. She does not have a limp upon walking. There is not piriformis tightness and there  is not capsular tightness with any rotation. Dorsalis pedis and posterior tibial artery pulses are palpable. Neurovascular status is intact and capillary refill is less than 2 seconds. Common peroneal nerve function is well preserved.      Result Review :                  Large Joint Arthrocentesis: L hip joint  Date/Time: 9/13/2021 3:20 PM  Consent given by: patient  Site marked: site marked  Timeout: Immediately prior to procedure a time out was called to verify the correct patient, procedure, equipment, support staff and site/side marked as required   Supporting Documentation  Indications: pain   Procedure Details  Location: hip - L hip joint  Preparation: Patient was prepped and draped in the usual sterile fashion  Needle size: 22 G  Medications " administered: 2 mL lidocaine (cardiac); 160 mg methylPREDNISolone acetate 80 MG/ML  Patient tolerance: patient tolerated the procedure well with no immediate complications               Assessment   Assessment and Plan    Problem List Items Addressed This Visit        Musculoskeletal and Injuries    Left hip pain - Primary          Follow Up   · Injected patient's left hip-greater trochanteric bursa joint(s)with Depo-Medrol from an anterolateral approach   · Use a cane for assistance with ambulation for falls prevention.  · Rest, ice, compression, and elevation (RICE) therapy  · OTC Alternate Ibuprofen and Tylenol as needed  · Follow up in 3 month(s)  • Patient was given instructions and counseling regarding her condition or for health maintenance advice. Please see specific information pulled into the AVS if appropriate.     Honorio Hayes MD   Date of Encounter: 9/13/2021        EMR Dragon/Transcription disclaimer:  Much of this encounter note is an electronic transcription/translation of spoken language to printed text. The electronic translation of spoken language may permit erroneous, or at times, nonsensical words or phrases to be inadvertently transcribed; Although I have reviewed the note for such errors, some may still exist.

## 2021-12-16 ENCOUNTER — CLINICAL SUPPORT (OUTPATIENT)
Dept: ORTHOPEDIC SURGERY | Facility: CLINIC | Age: 66
End: 2021-12-16

## 2021-12-16 VITALS — HEIGHT: 63 IN | TEMPERATURE: 98.6 F | BODY MASS INDEX: 26.93 KG/M2 | WEIGHT: 152 LBS

## 2021-12-16 DIAGNOSIS — M25.552 LEFT HIP PAIN: Primary | ICD-10-CM

## 2021-12-16 PROCEDURE — 20610 DRAIN/INJ JOINT/BURSA W/O US: CPT | Performed by: ORTHOPAEDIC SURGERY

## 2021-12-16 RX ADMIN — TRIAMCINOLONE ACETONIDE 80 MG: 40 INJECTION, SUSPENSION INTRA-ARTICULAR; INTRAMUSCULAR at 13:51

## 2021-12-16 NOTE — PROGRESS NOTES
INJECTION    Patient: Pearl Temple    YOB: 1955    MRN: [id number]    Chief Complaints: left hip pain    History of Present Illness: Patient returns for follow-up on hip pain. The pain is over the lateral aspect of the left hip at the greater trochanteric bursa.  Her pain has been progressive in nature and remains intermittent .   Her pain is worsened  going up and down stairs, rising after sitting. There has been improvement in the past with injections.    This problem is not new to this examiner.     Allergies:   Allergies   Allergen Reactions   • Codeine Nausea And Vomiting   • Dye Fdc Red [Red Dye] Hives   • Levaquin [Levofloxacin] Hives   • Lortab [Hydrocodone-Acetaminophen] Itching   • Morphine And Related Hives   • Penicillins Hives   • Propafenone Itching   • Robaxin [Methocarbamol] Itching   • Toradol [Ketorolac Tromethamine] Headache   • Valium [Diazepam] Dizziness       Medications:   Home Medications:  Current Outpatient Medications on File Prior to Visit   Medication Sig   • ALREX 0.2 % suspension    • Cholecalciferol (Vitamin D3) 50 MCG (2000 UT) capsule Take 2,000 Units by mouth Daily.   • COMBIVENT RESPIMAT  MCG/ACT inhaler    • diclofenac (VOLTAREN) 1 % gel gel Apply 4 g topically 2 (Two) Times a Day.   • esomeprazole (nexIUM) 40 MG capsule    • FARXIGA 5 MG tablet tablet    • ferrous gluconate (FERGON) 324 MG tablet    • fluconazole (DIFLUCAN) 150 MG tablet    • Fluzone High-Dose Quadrivalent 0.7 ML suspension prefilled syringe    • Janumet  MG per tablet    • JARDIANCE 25 MG tablet    • magnesium oxide (MAG-OX) 400 MG tablet    • methenamine (HIPREX) 1 g tablet    • montelukast (SINGULAIR) 10 MG tablet    • nystatin (MYCOSTATIN) 076991 UNIT/ML suspension    • oxybutynin XL (DITROPAN-XL) 10 MG 24 hr tablet    • oxybutynin XL (DITROPAN-XL) 5 MG 24 hr tablet Take 5 mg by mouth Daily.   • Semaglutide (Rybelsus) 3 MG tablet Take  by mouth. TAKING RYBELSUS   • Semaglutide  "(Rybelsus) 7 MG tablet Take  by mouth.     No current facility-administered medications on file prior to visit.     Current Medications:  Scheduled Meds:  PRN Meds:.    I have reviewed the patient's medical history in detail and updated the computerized patient record.  Review and summarization of old records include:    Past Medical History:   Diagnosis Date   • Acid reflux    • Diabetes (HCC)      Past Surgical History:   Procedure Laterality Date   • APPENDECTOMY     • CHOLECYSTECTOMY     • FOOT SURGERY     • HYSTERECTOMY     • KNEE SURGERY       Social History     Occupational History   • Not on file   Tobacco Use   • Smoking status: Never Smoker   • Smokeless tobacco: Never Used   Substance and Sexual Activity   • Alcohol use: No   • Drug use: Not on file   • Sexual activity: Not on file      Social History     Social History Narrative   • Not on file     Family History   Problem Relation Age of Onset   • Alzheimer's disease Mother    • Cancer Father        Review of Systems  Constitutional: Negative for appetite change.   HENT: Negative.    Eyes: Negative.    Respiratory: Negative.    Cardiovascular: Negative.    Gastrointestinal: Negative.    Endocrine: Negative.    Genitourinary: Negative.    Musculoskeletal: See details of exam below.  Skin: Negative.    Allergic/Immunologic: Negative.    Hematological: Negative.    Psychiatric/Behavioral: Negative.          Wt Readings from Last 3 Encounters:   12/16/21 68.9 kg (152 lb)   09/13/21 70.3 kg (155 lb)   06/10/21 70.3 kg (155 lb)     Ht Readings from Last 3 Encounters:   12/16/21 160 cm (63\")   09/13/21 160 cm (63\")   06/10/21 160 cm (63\")     Body mass index is 26.93 kg/m².  Facility age limit for growth percentiles is 20 years.  Vitals:    12/16/21 1328   Temp: 98.6 °F (37 °C)       Physical Exam  Constitutional: Patient is oriented to person, place, and time. Appears well-developed and well-nourished.   HENT:   Head: Normocephalic and atraumatic.   Eyes: " Conjunctivae and EOM are normal. Pupils are equal, round, and reactive to light.   Cardiovascular: Normal rate, regular rhythm, normal heart sounds and intact distal pulses.   Pulmonary/Chest: Effort normal and breath sounds normal.   Musculoskeletal:   See detailed exam below   Neurological: Alert and oriented to person, place, and time. No sensory deficit. Coordination normal.   Skin: Skin is warm and dry. Capillary refill takes less than 2 seconds. No rash noted. No erythema.   Psychiatric: Patient has a normal mood and affect. Her behavior is normal. Judgment and thought content normal.   Nursing note and vitals reviewed.    Musculoskeletal:    Left hip (gtb): Skin and soft tissues over the greater trochanteric bursa are tender upon palpation, along with IT band tenderness. Cross body adduction is negative. Internal and external rotations are bothersome and cause tenderness over the greater trochanter. There is no clinical deformity and no shortening. She does not have a limp upon walking. There is not piriformis tightness and there  is not capsular tightness with any rotation. Dorsalis pedis and posterior tibial artery pulses are palpable. Neurovascular status is intact and capillary refill is less than 2 seconds. Common peroneal nerve function is well preserved.      Diagnostics:      Procedure:  Large Joint Arthrocentesis: L greater trochanteric bursa  Date/Time: 12/16/2021 1:51 PM  Consent given by: patient  Site marked: site marked  Timeout: Immediately prior to procedure a time out was called to verify the correct patient, procedure, equipment, support staff and site/side marked as required   Supporting Documentation  Indications: pain   Procedure Details  Location: hip - L greater trochanteric bursa  Preparation: Patient was prepped and draped in the usual sterile fashion  Needle size: 25 G  Approach: anteromedial  Medications administered: 2 mL lidocaine (cardiac); 80 mg triamcinolone acetonide 40  MG/ML  Patient tolerance: patient tolerated the procedure well with no immediate complications          Assessment:   Diagnoses and all orders for this visit:    1. Left hip pain (Primary)          Plan:   · Injected patient's left hip-greater trochanteric bursa joint(s)with Kenalog from an anterolateral approach   · Compression/brace   · Rest, ice, compression, and elevation (RICE) therapy  · OTC Alternate Ibuprofen and Tylenol as needed  · Follow up in 3 month(s)    Date of encounter: 12/16/2021   Honorio Hayes MD

## 2021-12-17 RX ORDER — TRIAMCINOLONE ACETONIDE 40 MG/ML
80 INJECTION, SUSPENSION INTRA-ARTICULAR; INTRAMUSCULAR
Status: COMPLETED | OUTPATIENT
Start: 2021-12-16 | End: 2021-12-16

## 2022-03-24 ENCOUNTER — OFFICE VISIT (OUTPATIENT)
Dept: ORTHOPEDIC SURGERY | Facility: CLINIC | Age: 67
End: 2022-03-24

## 2022-03-24 VITALS — TEMPERATURE: 98.5 F | BODY MASS INDEX: 26.93 KG/M2 | WEIGHT: 152 LBS | HEIGHT: 63 IN

## 2022-03-24 DIAGNOSIS — M70.61 GREATER TROCHANTERIC BURSITIS OF BOTH HIPS: Primary | ICD-10-CM

## 2022-03-24 DIAGNOSIS — M70.62 GREATER TROCHANTERIC BURSITIS OF BOTH HIPS: Primary | ICD-10-CM

## 2022-03-24 PROCEDURE — 20610 DRAIN/INJ JOINT/BURSA W/O US: CPT | Performed by: ORTHOPAEDIC SURGERY

## 2022-03-24 RX ORDER — TRIAMCINOLONE ACETONIDE 40 MG/ML
80 INJECTION, SUSPENSION INTRA-ARTICULAR; INTRAMUSCULAR
Status: COMPLETED | OUTPATIENT
Start: 2022-03-24 | End: 2022-03-24

## 2022-03-24 RX ORDER — LIDOCAINE HYDROCHLORIDE 10 MG/ML
2 INJECTION, SOLUTION EPIDURAL; INFILTRATION; INTRACAUDAL; PERINEURAL
Status: COMPLETED | OUTPATIENT
Start: 2022-03-24 | End: 2022-03-24

## 2022-03-24 RX ADMIN — LIDOCAINE HYDROCHLORIDE 2 ML: 10 INJECTION, SOLUTION EPIDURAL; INFILTRATION; INTRACAUDAL; PERINEURAL at 13:52

## 2022-03-24 RX ADMIN — LIDOCAINE HYDROCHLORIDE 2 ML: 10 INJECTION, SOLUTION EPIDURAL; INFILTRATION; INTRACAUDAL; PERINEURAL at 13:49

## 2022-03-24 RX ADMIN — TRIAMCINOLONE ACETONIDE 80 MG: 40 INJECTION, SUSPENSION INTRA-ARTICULAR; INTRAMUSCULAR at 13:52

## 2022-03-24 RX ADMIN — TRIAMCINOLONE ACETONIDE 80 MG: 40 INJECTION, SUSPENSION INTRA-ARTICULAR; INTRAMUSCULAR at 13:49

## 2022-03-24 NOTE — PROGRESS NOTES
"Chief Complaint  Follow-up and Pain of the Right Hip and Follow-up and Pain of the Left Hip    Subjective    History of Present Illness      Pearl Temple is a 66 y.o. female who presents to [facility] for Patient returns for follow-up on hip pain. The pain is over the lateral aspect of the bilateral hip at the greater trochanteric bursa.  Her pain has been progressive in nature and remains intermittent .   Her pain is worsened  sitting, standing. There has been improvement in the past with injections.      Objective   Vital Signs:   Temp 98.5 °F (36.9 °C)   Ht 160 cm (63\")   Wt 68.9 kg (152 lb)   BMI 26.93 kg/m²     Physical Exam  66 y.o. female is awake, alert, oriented, in no acute distress and well developed, well nourished.  Ortho Exam   BILATERAL hip  Bilateral hip (gtb): Skin and soft tissues over the greater trochanteric bursa are tender upon palpation, along with IT band tenderness. Cross body adduction is negative. Internal and external rotations are bothersome and cause tenderness over the greater trochanter. There is no clinical deformity and no shortening. She does have a limp upon walking. There is not piriformis tightness and there  is not capsular tightness with any rotation. Dorsalis pedis and posterior tibial artery pulses are palpable. Neurovascular status is intact and capillary refill is less than 2 seconds. Common peroneal nerve function is well preserved.      Result Review :                  Large Joint Arthrocentesis: R greater trochanteric bursa  Date/Time: 3/24/2022 1:49 PM  Consent given by: patient  Site marked: site marked  Timeout: Immediately prior to procedure a time out was called to verify the correct patient, procedure, equipment, support staff and site/side marked as required   Supporting Documentation  Indications: pain   Procedure Details  Location: hip - R greater trochanteric bursa  Preparation: Patient was prepped and draped in the usual " sterile fashion  Needle size: 22 G  Medications administered: 2 mL lidocaine PF 1% 1 %; 80 mg triamcinolone acetonide 40 MG/ML  Patient tolerance: patient tolerated the procedure well with no immediate complications    Large Joint Arthrocentesis: L greater trochanteric bursa  Date/Time: 3/24/2022 1:52 PM  Consent given by: patient  Site marked: site marked  Timeout: Immediately prior to procedure a time out was called to verify the correct patient, procedure, equipment, support staff and site/side marked as required   Supporting Documentation  Indications: pain   Procedure Details  Location: hip - L greater trochanteric bursa  Preparation: Patient was prepped and draped in the usual sterile fashion  Needle size: 22 G  Medications administered: 2 mL lidocaine PF 1% 1 %; 80 mg triamcinolone acetonide 40 MG/ML  Patient tolerance: patient tolerated the procedure well with no immediate complications               Assessment   Assessment and Plan    Problem List Items Addressed This Visit    None         Follow Up   · Injected patient's bilateral hip-greater trochanteric bursa joint(s)with Kenalog from an anterolateral approach   · Compression/brace   · Rest, ice, compression, and elevation (RICE) therapy  · OTC Alternate Ibuprofen and Tylenol as needed  · Follow up in 3 month(s)  • Patient was given instructions and counseling regarding her condition or for health maintenance advice. Please see specific information pulled into the AVS if appropriate.     Honorio Hayes MD   Date of Encounter: 3/24/2022        EMR Dragon/Transcription disclaimer:  Much of this encounter note is an electronic transcription/translation of spoken language to printed text. The electronic translation of spoken language may permit erroneous, or at times, nonsensical words or phrases to be inadvertently transcribed; Although I have reviewed the note for such errors, some may still exist.

## 2022-06-20 DIAGNOSIS — M70.61 GREATER TROCHANTERIC BURSITIS OF BOTH HIPS: Primary | ICD-10-CM

## 2022-06-20 DIAGNOSIS — M70.62 GREATER TROCHANTERIC BURSITIS OF BOTH HIPS: Primary | ICD-10-CM

## 2022-06-23 ENCOUNTER — HOSPITAL ENCOUNTER (OUTPATIENT)
Dept: GENERAL RADIOLOGY | Facility: HOSPITAL | Age: 67
Discharge: HOME | End: 2022-06-23
Admitting: ORTHOPAEDIC SURGERY

## 2022-06-23 ENCOUNTER — CLINICAL SUPPORT (OUTPATIENT)
Dept: ORTHOPEDIC SURGERY | Facility: CLINIC | Age: 67
End: 2022-06-23

## 2022-06-23 VITALS — HEIGHT: 63 IN | WEIGHT: 172 LBS | BODY MASS INDEX: 30.48 KG/M2

## 2022-06-23 DIAGNOSIS — M70.62 GREATER TROCHANTERIC BURSITIS OF BOTH HIPS: ICD-10-CM

## 2022-06-23 DIAGNOSIS — M70.62 GREATER TROCHANTERIC BURSITIS OF BOTH HIPS: Primary | ICD-10-CM

## 2022-06-23 DIAGNOSIS — M70.61 GREATER TROCHANTERIC BURSITIS OF BOTH HIPS: Primary | ICD-10-CM

## 2022-06-23 DIAGNOSIS — M70.61 GREATER TROCHANTERIC BURSITIS OF BOTH HIPS: ICD-10-CM

## 2022-06-23 PROCEDURE — 99213 OFFICE O/P EST LOW 20 MIN: CPT | Performed by: ORTHOPAEDIC SURGERY

## 2022-06-23 PROCEDURE — 73522 X-RAY EXAM HIPS BI 3-4 VIEWS: CPT

## 2022-06-23 PROCEDURE — 20610 DRAIN/INJ JOINT/BURSA W/O US: CPT | Performed by: ORTHOPAEDIC SURGERY

## 2022-06-23 RX ADMIN — METHYLPREDNISOLONE ACETATE: 80 INJECTION, SUSPENSION INTRA-ARTICULAR; INTRALESIONAL; INTRAMUSCULAR; SOFT TISSUE at 16:16

## 2022-06-23 RX ADMIN — METHYLPREDNISOLONE ACETATE: 80 INJECTION, SUSPENSION INTRA-ARTICULAR; INTRALESIONAL; INTRAMUSCULAR; SOFT TISSUE at 16:15

## 2022-07-11 RX ORDER — METHYLPREDNISOLONE ACETATE 80 MG/ML
INJECTION, SUSPENSION INTRA-ARTICULAR; INTRALESIONAL; INTRAMUSCULAR; SOFT TISSUE
Status: COMPLETED | OUTPATIENT
Start: 2022-06-23 | End: 2022-06-23

## 2022-09-29 ENCOUNTER — CLINICAL SUPPORT (OUTPATIENT)
Dept: ORTHOPEDIC SURGERY | Facility: CLINIC | Age: 67
End: 2022-09-29

## 2022-09-29 VITALS — WEIGHT: 172 LBS | HEIGHT: 63 IN | TEMPERATURE: 98.6 F | BODY MASS INDEX: 30.48 KG/M2

## 2022-09-29 DIAGNOSIS — M70.62 GREATER TROCHANTERIC BURSITIS OF BOTH HIPS: ICD-10-CM

## 2022-09-29 DIAGNOSIS — M70.61 GREATER TROCHANTERIC BURSITIS OF BOTH HIPS: ICD-10-CM

## 2022-09-29 DIAGNOSIS — R53.1 WEAKNESS: Primary | ICD-10-CM

## 2022-09-29 PROCEDURE — 99212 OFFICE O/P EST SF 10 MIN: CPT | Performed by: ORTHOPAEDIC SURGERY

## 2022-09-29 PROCEDURE — 20610 DRAIN/INJ JOINT/BURSA W/O US: CPT | Performed by: ORTHOPAEDIC SURGERY

## 2022-09-29 RX ADMIN — METHYLPREDNISOLONE ACETATE 160 MG: 80 INJECTION, SUSPENSION INTRA-ARTICULAR; INTRALESIONAL; INTRAMUSCULAR; SOFT TISSUE at 10:41

## 2022-09-29 RX ADMIN — LIDOCAINE HYDROCHLORIDE 2 ML: 20 INJECTION, SOLUTION EPIDURAL; INFILTRATION; INTRACAUDAL; PERINEURAL at 10:41

## 2022-10-12 RX ORDER — METHYLPREDNISOLONE ACETATE 80 MG/ML
160 INJECTION, SUSPENSION INTRA-ARTICULAR; INTRALESIONAL; INTRAMUSCULAR; SOFT TISSUE
Status: COMPLETED | OUTPATIENT
Start: 2022-09-29 | End: 2022-09-29

## 2022-10-12 RX ORDER — LIDOCAINE HYDROCHLORIDE 20 MG/ML
2 INJECTION, SOLUTION EPIDURAL; INFILTRATION; INTRACAUDAL; PERINEURAL
Status: COMPLETED | OUTPATIENT
Start: 2022-09-29 | End: 2022-09-29

## 2022-11-04 ENCOUNTER — TELEPHONE (OUTPATIENT)
Dept: ORTHOPEDIC SURGERY | Facility: CLINIC | Age: 67
End: 2022-11-04

## 2022-11-04 DIAGNOSIS — M70.61 GREATER TROCHANTERIC BURSITIS OF BOTH HIPS: ICD-10-CM

## 2022-11-04 DIAGNOSIS — R53.1 WEAKNESS: Primary | ICD-10-CM

## 2022-11-04 DIAGNOSIS — M70.62 GREATER TROCHANTERIC BURSITIS OF BOTH HIPS: ICD-10-CM

## 2022-11-04 NOTE — TELEPHONE ENCOUNTER
Caller: QUINCY    Relationship: [facility]    Best call back number: [phone]    What is the medical concern/diagnosis: WEAKNESS    What specialty or service is being requested: PHYSICAL THERAPY    What is the provider, practice or medical service name: [facility]    What is the office phone number: [phone]    FAX: [phone]    Any additional details: THE ORDER THEY HAVE IS OUT OF DATE - THE NEW ORDER'S START DATE SHOULD BE TODAY 11/4/22

## 2023-01-19 ENCOUNTER — CLINICAL SUPPORT (OUTPATIENT)
Dept: ORTHOPEDIC SURGERY | Facility: CLINIC | Age: 68
End: 2023-01-19
Payer: MEDICARE

## 2023-01-19 VITALS — HEIGHT: 63 IN | BODY MASS INDEX: 27.46 KG/M2 | WEIGHT: 155 LBS | TEMPERATURE: 98.6 F

## 2023-01-19 DIAGNOSIS — M70.61 GREATER TROCHANTERIC BURSITIS OF BOTH HIPS: Primary | ICD-10-CM

## 2023-01-19 DIAGNOSIS — M70.62 GREATER TROCHANTERIC BURSITIS OF BOTH HIPS: Primary | ICD-10-CM

## 2023-01-19 PROCEDURE — 20610 DRAIN/INJ JOINT/BURSA W/O US: CPT | Performed by: ORTHOPAEDIC SURGERY

## 2023-01-19 RX ADMIN — METHYLPREDNISOLONE ACETATE 160 MG: 80 INJECTION, SUSPENSION INTRA-ARTICULAR; INTRALESIONAL; INTRAMUSCULAR; SOFT TISSUE at 11:22

## 2023-01-19 RX ADMIN — LIDOCAINE HYDROCHLORIDE 2 ML: 20 INJECTION, SOLUTION EPIDURAL; INFILTRATION; INTRACAUDAL; PERINEURAL at 11:22

## 2023-01-19 NOTE — PROGRESS NOTES
"Chief Complaint  Follow-up and Pain of the Right Hip and Follow-up and Pain of the Left Hip    Subjective    History of Present Illness      Pearl Temple is a 67 y.o. female who presents to [facility] for Patient returns for follow-up on hip pain. The pain is over the lateral aspect of the bilateral hip at the greater trochanteric bursa.  Her pain has been progressive in nature and remains intermittent .   Her pain is worsened  rising after sitting. There has been improvement in the past with injections.      Objective   Vital Signs:   Temp 98.6 °F (37 °C)   Ht 160 cm (63\")   Wt 70.3 kg (155 lb)   BMI 27.46 kg/m²     Physical Exam  67 y.o. female is awake, alert, oriented, in no acute distress and well developed, well nourished.  Ortho Exam   BILATERAL hip  Bilateral hip (gtb): Skin and soft tissues over the greater trochanteric bursa are tender upon palpation, along with IT band tenderness. Cross body adduction is negative. Internal and external rotations are bothersome and cause tenderness over the greater trochanter. There is no clinical deformity and no shortening. She does not have a limp upon walking. There is not piriformis tightness and there  is not capsular tightness with any rotation. Dorsalis pedis and posterior tibial artery pulses are palpable. Neurovascular status is intact and capillary refill is less than 2 seconds. Common peroneal nerve function is well preserved.      Result Review :                  - Large Joint Arthrocentesis: bilateral greater trochanteric bursa on 1/19/2023 11:22 AM  Indications: pain  Details: 22 G needle  Medications (Right): 2 mL lidocaine PF 2% 2 %; 160 mg methylPREDNISolone acetate 80 MG/ML  Medications (Left): 160 mg methylPREDNISolone acetate 80 MG/ML; 2 mL lidocaine PF 2% 2 %  Outcome: tolerated well, no immediate complications  Procedure, treatment alternatives, risks and benefits explained, specific risks discussed. Consent was " given by the patient. Patient was prepped and draped in the usual sterile fashion.                Assessment   Assessment and Plan    Problem List Items Addressed This Visit        Musculoskeletal and Injuries    Greater trochanteric bursitis of both hips - Primary    Relevant Orders    - Large Joint Arthrocentesis: bilateral greater trochanteric bursa       Follow Up   · Injected patient's bilateral hip-greater trochanteric bursa joint(s)with Depo-Medrol from an anterolateral approach   · Compression/brace   · Rest, ice, compression, and elevation (RICE) therapy  · OTC Alternate Ibuprofen and Tylenol as needed  · Follow up in 3 month(s)  • Patient was given instructions and counseling regarding her condition or for health maintenance advice. Please see specific information pulled into the AVS if appropriate.     Honorio Hayes MD   Date of Encounter: 1/19/2023   Electronically signed by Honorio Hayes MD, 01/19/23, 11:22 AM EST.     EMR Dragon/Transcription disclaimer:  Much of this encounter note is an electronic transcription/translation of spoken language to printed text. The electronic translation of spoken language may permit erroneous, or at times, nonsensical words or phrases to be inadvertently transcribed; Although I have reviewed the note for such errors, some may still exist.

## 2023-01-27 RX ORDER — METHYLPREDNISOLONE ACETATE 80 MG/ML
160 INJECTION, SUSPENSION INTRA-ARTICULAR; INTRALESIONAL; INTRAMUSCULAR; SOFT TISSUE
Status: COMPLETED | OUTPATIENT
Start: 2023-01-19 | End: 2023-01-19

## 2023-01-27 RX ORDER — LIDOCAINE HYDROCHLORIDE 20 MG/ML
2 INJECTION, SOLUTION EPIDURAL; INFILTRATION; INTRACAUDAL; PERINEURAL
Status: COMPLETED | OUTPATIENT
Start: 2023-01-19 | End: 2023-01-19

## 2023-04-20 ENCOUNTER — CLINICAL SUPPORT (OUTPATIENT)
Dept: ORTHOPEDIC SURGERY | Facility: CLINIC | Age: 68
End: 2023-04-20
Payer: MEDICARE

## 2023-04-20 ENCOUNTER — TELEPHONE (OUTPATIENT)
Dept: ORTHOPEDIC SURGERY | Facility: CLINIC | Age: 68
End: 2023-04-20

## 2023-04-20 VITALS — TEMPERATURE: 98.6 F | HEIGHT: 63 IN | BODY MASS INDEX: 27.46 KG/M2 | WEIGHT: 155 LBS

## 2023-04-20 DIAGNOSIS — M70.61 GREATER TROCHANTERIC BURSITIS OF BOTH HIPS: Primary | ICD-10-CM

## 2023-04-20 DIAGNOSIS — M70.62 GREATER TROCHANTERIC BURSITIS OF BOTH HIPS: Primary | ICD-10-CM

## 2023-04-20 RX ADMIN — METHYLPREDNISOLONE ACETATE 160 MG: 80 INJECTION, SUSPENSION INTRA-ARTICULAR; INTRALESIONAL; INTRAMUSCULAR; SOFT TISSUE at 11:32

## 2023-04-20 RX ADMIN — LIDOCAINE HYDROCHLORIDE 2 ML: 10 INJECTION, SOLUTION EPIDURAL; INFILTRATION; INTRACAUDAL; PERINEURAL at 11:32

## 2023-04-20 NOTE — PROGRESS NOTES
"Chief Complaint  Follow-up and Pain of the Right Hip and Follow-up and Pain of the Left Hip    Subjective    History of Present Illness      Pearl Temple is a 67 y.o. female who presents to [facility] for Patient returns for follow-up on hip pain. The pain is over the lateral aspect of the bilateral hip at the greater trochanteric bursa.  Her pain has been progressive in nature and remains intermittent .   Her pain is worsened  rising after sitting. There has been improvement in the past with injections.      Objective   Vital Signs:   Temp 98.6 °F (37 °C)   Ht 160 cm (63\")   Wt 70.3 kg (155 lb)   BMI 27.46 kg/m²     Physical Exam  67 y.o. female is awake, alert, oriented, in no acute distress and well developed, well nourished.  Ortho Exam   BILATERAL hip  Bilateral hip (gtb): Skin and soft tissues over the greater trochanteric bursa are tender upon palpation, along with IT band tenderness. Cross body adduction is negative. Internal and external rotations are bothersome and cause tenderness over the greater trochanter. There is no clinical deformity and no shortening. She does not have a limp upon walking. There is not piriformis tightness and there  is not capsular tightness with any rotation. Dorsalis pedis and posterior tibial artery pulses are palpable. Neurovascular status is intact and capillary refill is less than 2 seconds. Common peroneal nerve function is well preserved.      Result Review :                  - Large Joint Arthrocentesis: bilateral greater trochanteric bursa on 4/20/2023 11:32 AM  Indications: pain  Details: 22 G needle, anterolateral approach  Medications (Right): 2 mL lidocaine PF 1% 1 %; 160 mg methylPREDNISolone acetate 80 MG/ML  Medications (Left): 160 mg methylPREDNISolone acetate 80 MG/ML; 2 mL lidocaine PF 1% 1 %  Outcome: tolerated well, no immediate complications  Procedure, treatment alternatives, risks and benefits explained, specific risks " discussed. Consent was given by the patient. Patient was prepped and draped in the usual sterile fashion.                Assessment   Assessment and Plan    Problem List Items Addressed This Visit        Musculoskeletal and Injuries    Greater trochanteric bursitis of both hips - Primary    Relevant Orders    - Large Joint Arthrocentesis: bilateral greater trochanteric bursa       Follow Up   · Injected patient's bilateral hip-greater trochanteric bursa joint(s)with Depo-Medrol from an anterolateral approach   · Compression/brace   · Rest, ice, compression, and elevation (RICE) therapy  · OTC Alternate Ibuprofen and Tylenol as needed  · Follow up in 3 month(s)  • Patient was given instructions and counseling regarding her condition or for health maintenance advice. Please see specific information pulled into the AVS if appropriate.     Honorio Hayes MD   Date of Encounter: 4/20/2023   Electronically signed by Honorio Hayes MD, 04/20/23, 11:32 AM EDT.     EMR Dragon/Transcription disclaimer:  Much of this encounter note is an electronic transcription/translation of spoken language to printed text. The electronic translation of spoken language may permit erroneous, or at times, nonsensical words or phrases to be inadvertently transcribed; Although I have reviewed the note for such errors, some may still exist.

## 2023-04-20 NOTE — TELEPHONE ENCOUNTER
Caller: KAPIL ROMO    Relationship to patient: SELF    Best call back number: [phone]    Chief complaint: BILATERAL HIP PAIN    Type of visit:  [facility] GTB INJ    Requested date: @3 MONTHS

## 2023-05-02 RX ORDER — METHYLPREDNISOLONE ACETATE 80 MG/ML
160 INJECTION, SUSPENSION INTRA-ARTICULAR; INTRALESIONAL; INTRAMUSCULAR; SOFT TISSUE
Status: COMPLETED | OUTPATIENT
Start: 2023-04-20 | End: 2023-04-20

## 2023-05-02 RX ORDER — LIDOCAINE HYDROCHLORIDE 10 MG/ML
2 INJECTION, SOLUTION EPIDURAL; INFILTRATION; INTRACAUDAL; PERINEURAL
Status: COMPLETED | OUTPATIENT
Start: 2023-04-20 | End: 2023-04-20

## 2023-07-20 ENCOUNTER — CLINICAL SUPPORT (OUTPATIENT)
Dept: ORTHOPEDIC SURGERY | Facility: CLINIC | Age: 68
End: 2023-07-20
Payer: MEDICARE

## 2023-07-20 VITALS — HEIGHT: 63 IN | TEMPERATURE: 98.6 F | BODY MASS INDEX: 27.29 KG/M2 | WEIGHT: 154 LBS

## 2023-07-20 DIAGNOSIS — M70.62 GREATER TROCHANTERIC BURSITIS OF BOTH HIPS: Primary | ICD-10-CM

## 2023-07-20 DIAGNOSIS — M70.61 GREATER TROCHANTERIC BURSITIS OF BOTH HIPS: Primary | ICD-10-CM

## 2023-07-20 RX ADMIN — LIDOCAINE HYDROCHLORIDE 2 ML: 20 INJECTION, SOLUTION EPIDURAL; INFILTRATION; INTRACAUDAL; PERINEURAL at 11:15

## 2023-07-20 RX ADMIN — METHYLPREDNISOLONE ACETATE 160 MG: 80 INJECTION, SUSPENSION INTRA-ARTICULAR; INTRALESIONAL; INTRAMUSCULAR; SOFT TISSUE at 11:15

## 2023-08-02 RX ORDER — LIDOCAINE HYDROCHLORIDE 20 MG/ML
2 INJECTION, SOLUTION EPIDURAL; INFILTRATION; INTRACAUDAL; PERINEURAL
Status: COMPLETED | OUTPATIENT
Start: 2023-07-20 | End: 2023-07-20

## 2023-08-02 RX ORDER — METHYLPREDNISOLONE ACETATE 80 MG/ML
160 INJECTION, SUSPENSION INTRA-ARTICULAR; INTRALESIONAL; INTRAMUSCULAR; SOFT TISSUE
Status: COMPLETED | OUTPATIENT
Start: 2023-07-20 | End: 2023-07-20

## 2023-10-26 ENCOUNTER — CLINICAL SUPPORT (OUTPATIENT)
Dept: ORTHOPEDIC SURGERY | Facility: CLINIC | Age: 68
End: 2023-10-26
Payer: MEDICARE

## 2023-10-26 VITALS — WEIGHT: 154 LBS | BODY MASS INDEX: 27.29 KG/M2 | RESPIRATION RATE: 20 BRPM | HEIGHT: 63 IN

## 2023-10-26 DIAGNOSIS — M70.62 GREATER TROCHANTERIC BURSITIS OF BOTH HIPS: Primary | ICD-10-CM

## 2023-10-26 DIAGNOSIS — M70.61 GREATER TROCHANTERIC BURSITIS OF BOTH HIPS: Primary | ICD-10-CM

## 2023-10-26 RX ORDER — INSULIN GLARGINE 100 [IU]/ML
INJECTION, SOLUTION SUBCUTANEOUS NIGHTLY
COMMUNITY
Start: 2023-08-09

## 2023-10-26 RX ORDER — PEN NEEDLE, DIABETIC 31 GX5/16"
NEEDLE, DISPOSABLE MISCELLANEOUS SEE ADMIN INSTRUCTIONS
COMMUNITY
Start: 2023-10-11

## 2023-10-26 RX ADMIN — METHYLPREDNISOLONE ACETATE 160 MG: 80 INJECTION, SUSPENSION INTRA-ARTICULAR; INTRALESIONAL; INTRAMUSCULAR; SOFT TISSUE at 14:01

## 2023-10-26 RX ADMIN — LIDOCAINE HYDROCHLORIDE 2 ML: 10 INJECTION, SOLUTION EPIDURAL; INFILTRATION; INTRACAUDAL; PERINEURAL at 14:01

## 2023-10-26 NOTE — PROGRESS NOTES
INJECTION    Patient: Pearl Temple    YOB: 1955    MRN: [id number]    Chief Complaint   Patient presents with    Left Hip - Follow-up    Right Hip - Follow-up    Injections       History of Present Illness: Patient returns for follow-up on hip pain. The pain is over the lateral aspect of the bilateral hip at the greater trochanteric bursa.  Her pain has been progressive in nature and remains intermittent .   Her pain is worsened  rising after sitting, squatting. There has been improvement in the past with heat and injections.    This problem is not new to this examiner.     Allergies:   Allergies   Allergen Reactions    Codeine Nausea And Vomiting    Dye Fdc Red [Red Dye] Hives    Levaquin [Levofloxacin] Hives    Lortab [Hydrocodone-Acetaminophen] Itching    Morphine And Related Hives    Penicillins Hives    Propafenone Itching    Robaxin [Methocarbamol] Itching    Toradol [Ketorolac Tromethamine] Headache    Valium [Diazepam] Dizziness       Medications:   Home Medications:  Current Outpatient Medications on File Prior to Visit   Medication Sig    B-D ULTRAFINE III SHORT PEN 31G X 8 MM misc See Admin Instructions.    Cholecalciferol (Vitamin D3) 50 MCG (2000 UT) capsule Take 1 capsule by mouth Daily.    esomeprazole (nexIUM) 40 MG capsule     ferrous gluconate (FERGON) 324 MG tablet     Janumet  MG per tablet     JARDIANCE 25 MG tablet     Lantus SoloStar 100 UNIT/ML injection pen Inject  under the skin into the appropriate area as directed Every Night.    magnesium oxide (MAG-OX) 400 MG tablet     montelukast (SINGULAIR) 10 MG tablet     [DISCONTINUED] FARXIGA 5 MG tablet tablet     [DISCONTINUED] ALREX 0.2 % suspension     [DISCONTINUED] COMBIVENT RESPIMAT  MCG/ACT inhaler     [DISCONTINUED] diclofenac (VOLTAREN) 1 % gel gel Apply 4 g topically 2 (Two) Times a Day.    [DISCONTINUED] fluconazole (DIFLUCAN) 150 MG tablet     [DISCONTINUED] Fluzone High-Dose Quadrivalent 0.7 ML  "suspension prefilled syringe     [DISCONTINUED] methenamine (HIPREX) 1 g tablet     [DISCONTINUED] nystatin (MYCOSTATIN) 557116 UNIT/ML suspension     [DISCONTINUED] oxybutynin XL (DITROPAN-XL) 10 MG 24 hr tablet     [DISCONTINUED] oxybutynin XL (DITROPAN-XL) 5 MG 24 hr tablet Take 5 mg by mouth Daily.    [DISCONTINUED] Semaglutide (Rybelsus) 3 MG tablet Take  by mouth. TAKING RYBELSUS    [DISCONTINUED] Semaglutide (Rybelsus) 7 MG tablet Take  by mouth.     No current facility-administered medications on file prior to visit.     Current Medications:  Scheduled Meds:  PRN Meds:.    I have reviewed the patient's medical history in detail and updated the computerized patient record.  Review and summarization of old records include:    Past Medical History:   Diagnosis Date    Acid reflux     Diabetes      Past Surgical History:   Procedure Laterality Date    APPENDECTOMY      CHOLECYSTECTOMY      FOOT SURGERY      HYSTERECTOMY      KNEE SURGERY       Social History     Occupational History    Not on file   Tobacco Use    Smoking status: Never    Smokeless tobacco: Never   Vaping Use    Vaping Use: Never used   Substance and Sexual Activity    Alcohol use: No    Drug use: Never    Sexual activity: Defer      Social History     Social History Narrative    Not on file     Family History   Problem Relation Age of Onset    Alzheimer's disease Mother     Cancer Father        Review of Systems        Wt Readings from Last 3 Encounters:   10/26/23 69.9 kg (154 lb)   07/20/23 69.9 kg (154 lb)   04/20/23 70.3 kg (155 lb)     Ht Readings from Last 3 Encounters:   10/26/23 160 cm (63\")   07/20/23 160 cm (63\")   04/20/23 160 cm (63\")     Body mass index is 27.28 kg/m².  Facility age limit for growth %laura is 20 years.  Vitals:    10/26/23 1255   Resp: 20       Physical Exam    Musculoskeletal:    Bilateral hip (gtb): Skin and soft tissues over the greater trochanteric bursa are tender upon palpation, along with IT band tenderness. " Cross body adduction is negative. Internal and external rotations are bothersome and cause tenderness over the greater trochanter. There is no clinical deformity and no shortening. She does not have a limp upon walking. There is not piriformis tightness and there  is not capsular tightness with any rotation. Dorsalis pedis and posterior tibial artery pulses are palpable. Neurovascular status is intact and capillary refill is less than 2 seconds. Common peroneal nerve function is well preserved.      Diagnostics:      Procedure:  Large Joint Arthrocentesis: R greater trochanteric bursa  Date/Time: 10/26/2023 2:01 PM  Consent given by: patient  Site marked: site marked  Timeout: Immediately prior to procedure a time out was called to verify the correct patient, procedure, equipment, support staff and site/side marked as required   Supporting Documentation  Indications: pain   Procedure Details  Location: hip - R greater trochanteric bursa  Preparation: Patient was prepped and draped in the usual sterile fashion  Needle size: 25 G  Approach: anteromedial  Medications administered: 160 mg methylPREDNISolone acetate 80 MG/ML; 2 mL lidocaine PF 1% 1 %  Patient tolerance: patient tolerated the procedure well with no immediate complications      Large Joint Arthrocentesis: L greater trochanteric bursa  Date/Time: 10/26/2023 2:01 PM  Consent given by: patient  Site marked: site marked  Timeout: Immediately prior to procedure a time out was called to verify the correct patient, procedure, equipment, support staff and site/side marked as required   Supporting Documentation  Indications: pain   Procedure Details  Location: hip - L greater trochanteric bursa  Preparation: Patient was prepped and draped in the usual sterile fashion  Needle size: 25 G  Approach: anteromedial  Medications administered: 160 mg methylPREDNISolone acetate 80 MG/ML; 2 mL lidocaine PF 1% 1 %  Patient tolerance: patient tolerated the procedure well with  no immediate complications            Assessment:   Diagnoses and all orders for this visit:    1. Greater trochanteric bursitis of both hips (Primary)  -     Large Joint Arthrocentesis: R greater trochanteric bursa  -     Large Joint Arthrocentesis: L greater trochanteric bursa          Plan:   Injected patient's bilateral hip-greater trochanteric bursa joint(s)with Depo-Medrol from an anterolateral approach   Compression/brace   Rest, ice, compression, and elevation (RICE) therapy  OTC Tylenol 500-1000mg by mouth every 6 hours as needed for pain   Follow up in 3 month(s)    Date of encounter: 10/26/2023   Janna Singer MA

## 2023-10-27 ENCOUNTER — TELEPHONE (OUTPATIENT)
Dept: ORTHOPEDIC SURGERY | Facility: CLINIC | Age: 68
End: 2023-10-27
Payer: MEDICARE

## 2023-10-27 NOTE — TELEPHONE ENCOUNTER
Caller: MARTHA ROMO    Relationship to patient: SELF    Best call back number: [phone]    Chief complaint: BILATERAL HIPS    Type of visit: INJECTION    Requested date: ASAP    If rescheduling, when is the original appointment: N/A    Additional notes: FORGOT TO GET NEXT APPT WHEN SHE WAS IN OFFICE ON 10/26

## 2023-10-27 NOTE — TELEPHONE ENCOUNTER
LEFT MESSAGE FOR PATIENT TO CALL US IN DECEMBER AND WE MAY HAVE FURTHER INFO ON APPOINTMENT STATUS AND LOCATION OF WHERE DR BLANCO WILL BE

## 2023-11-08 RX ORDER — LIDOCAINE HYDROCHLORIDE 10 MG/ML
2 INJECTION, SOLUTION EPIDURAL; INFILTRATION; INTRACAUDAL; PERINEURAL
Status: COMPLETED | OUTPATIENT
Start: 2023-10-26 | End: 2023-10-26

## 2023-11-08 RX ORDER — METHYLPREDNISOLONE ACETATE 80 MG/ML
160 INJECTION, SUSPENSION INTRA-ARTICULAR; INTRALESIONAL; INTRAMUSCULAR; SOFT TISSUE
Status: COMPLETED | OUTPATIENT
Start: 2023-10-26 | End: 2023-10-26